# Patient Record
Sex: FEMALE | Race: WHITE | NOT HISPANIC OR LATINO | Employment: UNEMPLOYED | URBAN - METROPOLITAN AREA
[De-identification: names, ages, dates, MRNs, and addresses within clinical notes are randomized per-mention and may not be internally consistent; named-entity substitution may affect disease eponyms.]

---

## 2019-09-04 ENCOUNTER — HOSPITAL ENCOUNTER (OUTPATIENT)
Dept: SURGERY | Facility: CLINIC | Age: 44
Discharge: HOME | End: 2019-09-04
Attending: UROLOGY

## 2019-09-04 ENCOUNTER — HOSPITAL ENCOUNTER (OUTPATIENT)
Dept: GENERAL RADIOLOGY | Facility: HOSPITAL | Age: 44
Discharge: HOME | End: 2019-09-04
Attending: UROLOGY

## 2019-09-04 ENCOUNTER — OFFICE VISIT CONVERTED (OUTPATIENT)
Dept: UROLOGY | Facility: CLINIC | Age: 44
End: 2019-09-04
Attending: UROLOGY

## 2019-09-07 LAB
AMOXICILLIN+CLAV SUSC ISLT: 4
AMPICILLIN SUSC ISLT: 8
AMPICILLIN+SULBAC SUSC ISLT: 4
BACTERIA UR CULT: ABNORMAL
CEFAZOLIN SUSC ISLT: <=4
CEFEPIME SUSC ISLT: <=1
CEFTAZIDIME SUSC ISLT: <=1
CEFTRIAXONE SUSC ISLT: <=1
CEFUROXIME ORAL SUSC ISLT: 16
CEFUROXIME PARENTER SUSC ISLT: 16
CIPROFLOXACIN SUSC ISLT: <=0.25
ERTAPENEM SUSC ISLT: <=0.5
GENTAMICIN SUSC ISLT: <=1
LEVOFLOXACIN SUSC ISLT: <=0.12
NITROFURANTOIN SUSC ISLT: 32
TETRACYCLINE SUSC ISLT: <=1
TMP SMX SUSC ISLT: <=20
TOBRAMYCIN SUSC ISLT: <=1

## 2020-05-27 ENCOUNTER — TELEMEDICINE CONVERTED (OUTPATIENT)
Dept: UROLOGY | Facility: CLINIC | Age: 45
End: 2020-05-27
Attending: UROLOGY

## 2020-11-13 ENCOUNTER — TELEPHONE CONVERTED (OUTPATIENT)
Dept: UROLOGY | Facility: CLINIC | Age: 45
End: 2020-11-13
Attending: UROLOGY

## 2021-05-13 ENCOUNTER — HOSPITAL ENCOUNTER (OUTPATIENT)
Dept: SURGERY | Facility: CLINIC | Age: 46
Discharge: HOME | End: 2021-05-13
Attending: UROLOGY

## 2021-05-13 ENCOUNTER — OFFICE VISIT CONVERTED (OUTPATIENT)
Dept: UROLOGY | Facility: CLINIC | Age: 46
End: 2021-05-13
Attending: UROLOGY

## 2021-05-13 NOTE — PROGRESS NOTES
"   Progress Note      Patient Name: Rajesh Mortensen   Patient ID: [id number]   Sex: Female   YOB: 1975    Referring Provider: Elena Huynh MD    Visit Date: May 27, 2020    Provider: Elena Huynh MD   Location: [facility]   Location Address: [address] [place] [address]  [id number]   Location Phone: [phone]          Chief Complaint  · Pt is here for urological concerns      History Of Present Illness  Video Conferencing Visit  Rajesh Mortensen is a 44 year old /White female who is presenting for evaluation via video conferencing via zoom. Verbal consent obtained before beginning visit.   The following staff were present during this visit: Breanne Kat MA      History of MS, IC, and nephrolithiasis presents to establish urologic care.  Patient last saw her urologist in [place] and approximately 2017.  Currently believes that she is having an IC flare notes worsening of baseline IC symptoms, questions whether she has a UTI.  Reports baseline symptoms of burning, small amount of output, pelvic pain and urgency.  Notes relief of symptoms with \"just sitting on the toilet all the time.\"  Patient has been on numerous medications and tried various therapies over the years, primarily in [place] with former urologist.  Prior therapies include bladder instillation, Interstim PNE, Elmiron, hydroxyzine, Pyridium, Enablex, vaginal Valium among others.  History of nephrolithiasis last treated in 2013 with [facility] reports good results with this. Notes occasional moderate bilateral flank pain currently.  Denies fever or hematuria.    Update 5/27/2020: Patient presents for routine follow-up.  Patient became tearful during encounter as been unable to obtain care with neurologist since moved.  She states that she has been off of some of her MS meds for 3 months and others for up to a year.  Patient has also run out of her IC medications including hydroxyzine and Pyridium.  Patient " states she believes that she was doing better when taking Elmiron and requests restarting this.  Denies UTI since last visit, no flank pain.  Notes some bothersome incontinence which occurs without warning and is alarming to her.  She is uncertain if this is her IC, baseline, or MS.  But it has gotten worse since last visit.       Past Medical History  GERD (gastroesophageal reflux disease); Hypohidrotic ectodermal dysplasia syndrome; Interstitial cystitis (chronic) without hematuria; Kidney Stone; MS (multiple sclerosis); Nephrolithiasis         Past Surgical History  Carpal Tunnel Release; Gallbladder removal; Lithotripsy for kidney stones; Shoulder surgery         Medication List  Adderall XR 30 mg oral capsule,extended release 24hr; Azo Bladder Control 300 mg oral capsule; gabapentin 100 mg oral capsule; hydroxyzine HCl 25 mg oral tablet; Nuvigil 250 mg oral tablet; Prilosec oral; Requip 0.5 mg oral tablet; Wellbutrin  mg oral tablet extended release 24 hr         Allergy List  ampicillin       Allergies Reconciled  Family Medical History  *No Known Family History         Social History  Tobacco (Never)         Review of Systems  · Constitutional  o Denies  o : chills, fever  · Gastrointestinal  o Denies  o : nausea, vomiting, diarrhea      Physical Examination  · Constitutional  o Appearance  o : Well developed, well nourished, alert, in no acute distress.  · Head and Face  o Head  o :   § Inspection  § : Normocephalic, atraumatic  o Face  o :   § Inspection  § : No facial lesions  · Neurologic  o Mental Status Examination  o :   § Orientation  § : alert and oriented x 3  · Psychiatric  o Mood and Affect  o : mood normal, affect appropriate              Assessment  · MS (multiple sclerosis)     340/G35  · Nephrolithiasis     592.0/N20.0  · Interstitial cystitis (chronic) without hematuria     595.1/N30.10    Problems Reconciled  Plan  · Medications  o Medications have been Reconciled  o Transition of Care  or Provider Policy  · Instructions  o Electronically Identified Patient Education Materials Provided Electronically     Interstitial cystitis refill medications   hydroxyzine 25 mg tab p.o. nightly ordered  Pyridium 200 mg tab every 8 as needed dysuria ordered  Start Elmiron 100 mg nightly as patient previously taking    Nephrolithiasis KUB since last visit reviewed, continue to monitor    Refer to [facility] neurology, Dr. Huynh, to establish care for MS and obtain medications.  Discussed with patient uncertain if current worsening of urinary symptoms is due to the generalized stress of the pandemic as well as inability to obtain medical care and is likely exacerbated as has been off of her MS medications.  Recommend continued monitoring of urinary symptoms, timed and double voiding, with plan to reassess once MS has stabilized  Could consider UDS once back on MS medications if symptoms still bothersome  Patient agreeable to this  Follow-up in 3 months or earlier as needed  All questions addressed    Total time for encounter greater than 20 minutes due to counseling and coordination of care which dominated greater than 51% of the encounter.             Electronically Signed by: Elena Huynh MD -Author on May 27, 2020 01:58:01 PM

## 2021-05-13 NOTE — PROGRESS NOTES
"   Progress Note      Patient Name: Rajesh Mortensen   Patient ID: [id number]   Sex: Female   YOB: 1975        Visit Date: November 13, 2020    Provider: Elena Huynh MD   Location: [facility]   Location Address: [address] [place] [address]  [id number]   Location Phone: [phone]          Chief Complaint  · Pt is here for urological concerns     Telephone Visit- presents for evaluation via telephone.   Verbal consent obtained before beginning visit.   The following staff were present during this visit: Emma Browne LPN  Total time for encounter: 11 minutes       History Of Present Illness     44 yo lady with history of MS, IC, and nephrolithiasis presents to establish urologic care.  Patient last saw her urologist in [place] and approximately 2017.  Currently believes that she is having an IC flare notes worsening of baseline IC symptoms, questions whether she has a UTI.  Reports baseline symptoms of burning, small amount of output, pelvic pain and urgency.  Notes relief of symptoms with \"just sitting on the toilet all the time.\"  Patient has been on numerous medications and tried various therapies over the years, primarily in [place] with former urologist.  Prior therapies include bladder instillation, Interstim PNE, Elmiron, hydroxyzine, Pyridium, Enablex, vaginal Valium among others.  History of nephrolithiasis last treated in 2013 with ESWL reports good results with this. Notes occasional moderate bilateral flank pain currently.  Denies fever or hematuria.    Update 5/27/2020: Patient presents for routine follow-up.  Patient became tearful during encounter as been unable to obtain care with neurologist since moved.  She states that she has been off of some of her MS meds for 3 months and others for up to a year.  Patient has also run out of her IC medications including hydroxyzine and Pyridium.  Patient states she believes that she was doing better when taking " Elmiron and requests restarting this.  Denies UTI since last visit, no flank pain.  Notes some bothersome incontinence which occurs without warning and is alarming to her.  She is uncertain if this is her IC, baseline, or MS.  But it has gotten worse since last visit.    Update 11/13/20: Presents for follow up; doing well, finally got established with neuro at [facility], Dr. Irving; doing w/u for narcolepsy, possibly does not have MS.   Bladder symptoms are stable; has not noted much improvement on Elmiron.  Needs refill of Pyridium.  No new complaints today.       Past Medical History  GERD (gastroesophageal reflux disease); Hypohidrotic ectodermal dysplasia syndrome; Interstitial cystitis (chronic) without hematuria; Kidney stone; MS (multiple sclerosis); Nephrolithiasis         Past Surgical History  Carpal Tunnel Release; Gallbladder removal; Lithotripsy for kidney stones; Shoulder surgery         Medication List  Adderall XR 30 mg oral capsule,extended release 24hr; Azo Bladder Control 300 mg oral capsule; Elmiron 100 mg oral capsule; gabapentin 100 mg oral capsule; hydroxyzine HCl 25 mg oral tablet; Nuvigil 250 mg oral tablet; Prilosec oral; Pyridium 100 mg oral tablet; Requip 0.5 mg oral tablet; Wellbutrin  mg oral tablet extended release 24 hr         Allergy List  ampicillin       Allergies Reconciled  Family Medical History  *No Known Family History         Social History  Tobacco (Never)         Review of Systems  · Constitutional  o Denies  o : chills, fever  · Gastrointestinal  o Denies  o : nausea, vomiting, diarrhea              Assessment  · MS (multiple sclerosis)     340/G35  · Nephrolithiasis     592.0/N20.0  · Interstitial cystitis (chronic) without hematuria     595.1/N30.10    Problems Reconciled  Plan  · Orders  o Physician Telephone Evaluation, 11-20 minutes (78932) - 595.1/N30.10 - 11/13/2020  · Medications  o Medications have been Reconciled  o Transition of Care or Provider  Policy  · Instructions  o Electronically Identified Patient Education Materials Provided Electronically     Interstitial cystitis, symptoms stable refill medications   hydroxyzine 25 mg tab p.o. nightly continue   Pyridium 200 mg tab every 8 as needed dysuria; refilled     Black box warning of Elmiron discussed with patient at length; reports no beneficial response from Elmiron.  Will plan to wean off of Elmiron.  Patient to take every other night and every second night until weaning to off.  Patient agreeable to plan.  Patient encouraged to call office should she have a flare while weaning off Elmiron his bladder instillation with urology nurse practitioner could be scheduled.    As weaning off Elmiron, start amitriptyline 25 mg daily; patient previously took and tolerated well    Nephrolithiasis KUB since last visit reviewed, continue to monitor    Patient agreeable with the above plan   follow-up in 6 months or earlier as needed  All questions addressed             Electronically Signed by: Elena Huynh MD -Author on November 13, 2020 09:23:50 AM

## 2021-05-15 VITALS — WEIGHT: 221.12 LBS | RESPIRATION RATE: 14 BRPM | HEIGHT: 65 IN | BODY MASS INDEX: 36.84 KG/M2

## 2021-05-15 LAB — BACTERIA UR CULT: NORMAL

## 2021-06-06 NOTE — PROGRESS NOTES
"   Progress Note      Patient Name: Rajesh Mortensen   Patient ID: [id number]   Sex: Female   YOB: 1975        Visit Date: May 13, 2021    Provider: Elena Huynh MD   Location[facility]   Location Address: [address] [place] [address]  [id number]   Location Phone: [phone]          Chief Complaint  · Pt is here for urological concerns      History Of Present Illness     100,000 Klebsiella  4/13/2021: > 100,000 Klebsiella  3/5/2021: 50,000 Klebsiella  1/26/2021: > 100,000 Klebsiella\">46 yo lady with history of MS, IC, and nephrolithiasis presents to establish urologic care.  Patient last saw her urologist in [place] and approximately 2017.  Currently believes that she is having an IC flare notes worsening of baseline IC symptoms, questions whether she has a UTI.  Reports baseline symptoms of burning, small amount of output, pelvic pain and urgency.  Notes relief of symptoms with \"just sitting on the toilet all the time.\"  Patient has been on numerous medications and tried various therapies over the years, primarily in [place] with former urologist.  Prior therapies include bladder instillation, Interstim PNE, Elmiron, hydroxyzine, Pyridium, Enablex, vaginal Valium among others.  History of nephrolithiasis last treated in 2013 with ESWL reports good results with this. Notes occasional moderate bilateral flank pain currently.  Denies fever or hematuria.    Update 5/27/2020: Patient presents for routine follow-up.  Patient became tearful during encounter as been unable to obtain care with neurologist since moved.  She states that she has been off of some of her MS meds for 3 months and others for up to a year.  Patient has also run out of her IC medications including hydroxyzine and Pyridium.  Patient states she believes that she was doing better when taking Elmiron and requests restarting this.  Denies UTI since last visit, no flank pain.  Notes some bothersome " incontinence which occurs without warning and is alarming to her.  She is uncertain if this is her IC, baseline, or MS.  But it has gotten worse since last visit.    Update 11/13/20: Presents for follow up; doing well, finally got established with neuro at [facility], Dr. Irving; doing w/u for narcolepsy, possibly does not have MS.   Bladder symptoms are stable; has not noted much improvement on Elmiron.  Needs refill of Pyridium.  No new complaints today.    Update 5/13/2021: Patient presents for follow-up.  Has had numerous UTIs since last visit with positive urine culture. Presents with KUB prior to appointment today.  All cultures positive for Klebsiella with similar sensitivity profile.  States she does not ever have complete resolution of her symptoms while on antibiotics or after completing treatment course.  Notes her UTI symptoms to be dysuria, worsening urgency and frequency and worsening urinary urge incontinence.  Using oxytrol patch which has helped with incontinence.  Needs refill of Pyridium.  Currently on cefdinir.  PVR: 51 cc    _________  KUB, 5/10/2021 ([facility]) no acute findings.  Likely 5 mm left renal collecting system calculus    Urine culture  5/7/2021: > 100,000 Klebsiella  4/13/2021: > 100,000 Klebsiella  3/5/2021: 50,000 Klebsiella  1/26/2021: > 100,000 Klebsiella       Past Medical History  GERD (gastroesophageal reflux disease); Hypohidrotic ectodermal dysplasia syndrome; Interstitial cystitis (chronic) without hematuria; Kidney stone; MS (multiple sclerosis); Nephrolithiasis         Past Surgical History  Carpal Tunnel Release; Gallbladder removal; Lithotripsy for kidney stones; Shoulder surgery         Medication List  Adderall XR 30 mg oral capsule,extended release 24hr; amitriptyline 25 mg oral tablet; Azo Bladder Control 300 mg oral capsule; Bactrim -160 mg oral tablet; cefdinir 300 mg oral capsule; cefixime 400 mg oral capsule; Elmiron 100 mg oral capsule; gabapentin 100  "mg oral capsule; hydroxyzine HCl 25 mg oral tablet; Nuvigil 250 mg oral tablet; Prilosec oral; Pyridium 200 mg oral tablet; Requip 0.5 mg oral tablet; Sunosi 75 mg oral tablet; Wellbutrin  mg oral tablet extended release 24 hr         Allergy List  ampicillin       Allergies Reconciled  Family Medical History  *No Known Family History         Social History  Tobacco (Never)         Review of Systems  · Constitutional  o Denies  o : chills, fever  · Gastrointestinal  o Denies  o : nausea, vomiting, diarrhea      Vitals  Date Time BP Position Site L\R Cuff Size HR RR TEMP (F) WT  HT  BMI kg/m2 BSA m2 O2 Sat FR L/min FiO2 HC       05/13/2021 10:48 AM       16  123lbs 0oz 5'  5\" 20.47 1.6             Physical Examination  · Constitutional  o Appearance  o : Well nourished, well developed patient in no acute distress.  · Eyes  o Conjunctivae  o : Conjunctivae normal  o Sclerae  o : Sclerae white  · Ears, Nose, Mouth and Throat  o Ears  o :   § Hearing  § : Intact to conversational voice both ears  § Ears  § : Normal  o Nose  o :   § External Nose  § : Appearance normal  · Genitourinary  o Bladder  o : nontender to palpation  · Skin and Subcutaneous Tissue  o General Inspection  o : No rashes, lesions, or areas of discoloration present  o General Palpation  o : Skin Turgor Normal  · Neurologic  o Mental Status Examination  o :   § Orientation  § : Alert and Oriented X3  o Gait and Station  o :   § Gait Screening  § : Ambulating without difficulty  · Psychiatric  o Mood and Affect  o : Mood normal, affect appropriate          Results  · In-Office Procedures  o Surgical procedure  § IOP - Bladder Scan/Residual Urine (52001)   § Specimen vol Ur: 51mL       Assessment  · MS (multiple sclerosis)     340/G35  · Nephrolithiasis     592.0/N20.0  · Interstitial cystitis (chronic) without hematuria     595.1/N30.10  · Dysuria     788.1/R30.0  · Recurrent UTI     599.0/N39.0    Problems Reconciled  Plan  · Orders  o Urine " Culture (Clean Catch) [facility] (11401) - 788.1/R30.0 - 05/13/2021  · Medications  o Medications have been Reconciled  o Transition of Care or Provider Policy  · Instructions  o Electronically Identified Patient Education Materials Provided Electronically     Interstitial cystitis continue hydroxyzine and amitriptyline continue     Pyridium 200 mg tab every 8 as needed dysuria; refilled   Patient adequately emptying bladder with low postvoid residual bladder scan; continue to monitor given history of MS.    Nephrolithiasis small intrarenal left-sided calculi; discussed at length with patient; do not believe to be significantly impacting or exacerbating her lower urinary tract infections, continue to monitor    Recurrent versus persistent urinary tract infectionhas had 4+ urine culture UTI with Klebsiella; no intermittent relief of symptoms in between treatment.  All infections were treated per culture sensitivity.  Given lack of effectiveness of antibiotic treatment to alleviate her symptoms associated with UTI as well as persistence of Klebsiella bacteria with exact same sensitivity profile, and failure to alleviate her infection, recommend obtaining infectious diseases consultation for further evaluation and treatment considerations.  Discussed the goals of antibiotic stewardship; utilizing antibiotic for shortest duration of treatment per culture sensitivities as well as risks associated with antibiotics particularly prolonged antibiotic.  Given all these factors in our discussion, agreeable to infectious diseases consultation.  Refer to infectious disease consult    Patient currently on treatment via cefdinir; obtain urine culture today.  Will notify patient of the result.    Discussed trial of localized estrogen cream, Premarin, to assist with treatment as well as put eventual recurrence of urinary tract infection in the perimenopausal female.  Discussed that it can take up to 3 months for localized estrogen to take  effect and change the tissues.  Discussed trial of localized estrogen cream placed vaginally with patient.  Vaginal estrogen helps to create an acidic environment within the vagina.  The increased acidity makes it harder for bacteria to thrive, therefore protecting against urinary tract infections.  Discussed that along with proper hygiene, vaginal estrogen can reduce and even eliminate recurrent UTIs in some patients.    Patient does not exhibit any contraindication for trial of localized estrogen cream; discussed FDA black box warning. FDA warning notes to use lowest effective dose, shortest duration based on individual tx goals and risks.  Agreeable to trial of localized estrogen cream.  Order placed and sample provided.    Patient to follow-up in 4-6 months or earlier as needed  Obtain infectious disease consultation  Trial of localized estrogen cream, sent to pharmacy and sample provided  Pyridium refill sent to pharmacy  All questions addressed    MDM moderate: 2 stable chronic illnesses; review of KUB; prescription drug management             Electronically Signed by: Elena Huynh MD -Author on May 13, 2021 04:32:38 PM

## 2021-06-09 ENCOUNTER — TELEPHONE (OUTPATIENT)
Dept: UROLOGY | Facility: CLINIC | Age: 46
End: 2021-06-09

## 2021-06-09 NOTE — TELEPHONE ENCOUNTER
Patient said she had the UA done last Wednesday at [facility]. She said she was told to call. She asked for results.

## 2021-06-10 NOTE — TELEPHONE ENCOUNTER
LMOM, urine culture came back contaminated. She can go ahead and start prophylactic antibiotics unless she is symptomatic of a uti.

## 2021-07-15 VITALS — RESPIRATION RATE: 16 BRPM | HEIGHT: 65 IN | BODY MASS INDEX: 20.49 KG/M2 | WEIGHT: 123 LBS

## 2021-07-30 ENCOUNTER — TELEPHONE (OUTPATIENT)
Dept: UROLOGY | Facility: CLINIC | Age: 46
End: 2021-07-30

## 2021-08-06 DIAGNOSIS — N39.0 RECURRENT UTI (URINARY TRACT INFECTION): Primary | ICD-10-CM

## 2021-08-06 RX ORDER — SULFAMETHOXAZOLE AND TRIMETHOPRIM 400; 80 MG/1; MG/1
1 TABLET ORAL DAILY
Qty: 90 TABLET | Refills: 0 | Status: SHIPPED | OUTPATIENT
Start: 2021-08-06 | End: 2021-11-01 | Stop reason: SDUPTHER

## 2021-08-06 RX ORDER — AMITRIPTYLINE HYDROCHLORIDE 25 MG/1
25 TABLET, FILM COATED ORAL NIGHTLY
Qty: 30 TABLET | Refills: 3 | Status: SHIPPED | OUTPATIENT
Start: 2021-08-06 | End: 2021-12-03 | Stop reason: SDUPTHER

## 2021-11-01 ENCOUNTER — TELEPHONE (OUTPATIENT)
Dept: UROLOGY | Facility: CLINIC | Age: 46
End: 2021-11-01

## 2021-11-01 DIAGNOSIS — N39.0 RECURRENT UTI (URINARY TRACT INFECTION): ICD-10-CM

## 2021-11-01 RX ORDER — SULFAMETHOXAZOLE AND TRIMETHOPRIM 400; 80 MG/1; MG/1
1 TABLET ORAL DAILY
Qty: 90 TABLET | Refills: 1 | Status: SHIPPED | OUTPATIENT
Start: 2021-11-01 | End: 2022-01-03 | Stop reason: SDUPTHER

## 2021-11-01 NOTE — TELEPHONE ENCOUNTER
Pt wants to know if she could get some more bactrim. She has not been able to get an aapt with infectious disease yet.

## 2021-12-02 ENCOUNTER — TELEPHONE (OUTPATIENT)
Dept: UROLOGY | Facility: CLINIC | Age: 46
End: 2021-12-02

## 2021-12-03 DIAGNOSIS — N39.0 RECURRENT UTI (URINARY TRACT INFECTION): ICD-10-CM

## 2021-12-03 RX ORDER — AMITRIPTYLINE HYDROCHLORIDE 25 MG/1
25 TABLET, FILM COATED ORAL NIGHTLY
Qty: 90 TABLET | Refills: 3 | Status: SHIPPED | OUTPATIENT
Start: 2021-12-03 | End: 2022-12-01 | Stop reason: SDUPTHER

## 2021-12-03 RX ORDER — AMITRIPTYLINE HYDROCHLORIDE 25 MG/1
25 TABLET, FILM COATED ORAL NIGHTLY
Qty: 90 TABLET | Refills: 3 | Status: CANCELLED | OUTPATIENT
Start: 2021-12-03

## 2021-12-03 NOTE — ADDENDUM NOTE
Addended by: CHECO ARELLANO on: 12/3/2021 03:41 PM     Modules accepted: Level of Service, SmartSet

## 2021-12-16 ENCOUNTER — TELEPHONE (OUTPATIENT)
Dept: UROLOGY | Facility: CLINIC | Age: 46
End: 2021-12-16

## 2021-12-17 DIAGNOSIS — N30.10 INTERSTITIAL CYSTITIS: Primary | ICD-10-CM

## 2021-12-17 RX ORDER — HYDROXYZINE HYDROCHLORIDE 25 MG/1
25 TABLET, FILM COATED ORAL
Qty: 90 TABLET | Refills: 3 | Status: SHIPPED | OUTPATIENT
Start: 2021-12-17 | End: 2022-12-01 | Stop reason: SDUPTHER

## 2021-12-17 RX ORDER — HYDROXYZINE HYDROCHLORIDE 25 MG/1
25 TABLET, FILM COATED ORAL
COMMUNITY
Start: 2021-11-16 | End: 2021-12-17 | Stop reason: SDUPTHER

## 2022-01-03 ENCOUNTER — TELEPHONE (OUTPATIENT)
Dept: UROLOGY | Facility: CLINIC | Age: 47
End: 2022-01-03

## 2022-01-03 DIAGNOSIS — N39.0 RECURRENT UTI (URINARY TRACT INFECTION): ICD-10-CM

## 2022-01-03 RX ORDER — SULFAMETHOXAZOLE AND TRIMETHOPRIM 400; 80 MG/1; MG/1
1 TABLET ORAL DAILY
Qty: 90 TABLET | Refills: 1 | Status: SHIPPED | OUTPATIENT
Start: 2022-01-03 | End: 2022-04-03

## 2022-03-29 ENCOUNTER — TELEPHONE (OUTPATIENT)
Dept: UROLOGY | Facility: CLINIC | Age: 47
End: 2022-03-29

## 2022-03-29 DIAGNOSIS — R39.15 URGENCY OF URINATION: ICD-10-CM

## 2022-03-29 DIAGNOSIS — R35.0 FREQUENCY OF URINATION: Primary | ICD-10-CM

## 2022-03-29 NOTE — TELEPHONE ENCOUNTER
Patient has urinary frequency and continuous urgency.  Pain in bladder.  Thinks she has UTI, and said Dr. Doss will probably want to put in an order for her. She said she uses [facility] in [place].

## 2022-03-30 NOTE — TELEPHONE ENCOUNTER
Patient was lvmom to call back. If she calls back please let her know that we put in order for [facility] snd sent it to [facility] lab in .

## 2022-04-06 ENCOUNTER — TELEPHONE (OUTPATIENT)
Dept: SURGERY | Facility: CLINIC | Age: 47
End: 2022-04-06

## 2022-04-07 ENCOUNTER — TELEPHONE (OUTPATIENT)
Dept: UROLOGY | Facility: CLINIC | Age: 47
End: 2022-04-07

## 2022-04-07 NOTE — TELEPHONE ENCOUNTER
LMOM THAT I HAVE CALLED [facility] 3 TIMES FOR CULTURES AND ALSO FAXED A REQUEST. HAVENT RECEIVED ANYTHING FROM THEM YET. AS SOON AS WE GET SOMETHING, I WILL CALL HER. ASKED FOR CALL BACK.

## 2022-04-08 ENCOUNTER — TELEPHONE (OUTPATIENT)
Dept: UROLOGY | Facility: CLINIC | Age: 47
End: 2022-04-08

## 2022-04-08 DIAGNOSIS — N30.00 ACUTE CYSTITIS WITHOUT HEMATURIA: Primary | ICD-10-CM

## 2022-04-08 RX ORDER — LEVOFLOXACIN 500 MG/1
500 TABLET, FILM COATED ORAL DAILY
Qty: 10 TABLET | Refills: 0 | Status: SHIPPED | OUTPATIENT
Start: 2022-04-08 | End: 2022-04-18

## 2022-04-08 NOTE — TELEPHONE ENCOUNTER
Patient was called and notified of results and that atb has been sent to her pharmacy for . She confirmed understanding.

## 2022-09-20 ENCOUNTER — TELEPHONE (OUTPATIENT)
Dept: SURGERY | Facility: CLINIC | Age: 47
End: 2022-09-20

## 2022-09-20 ENCOUNTER — TELEPHONE (OUTPATIENT)
Dept: UROLOGY | Facility: CLINIC | Age: 47
End: 2022-09-20

## 2022-09-20 DIAGNOSIS — R30.0 DYSURIA: Primary | ICD-10-CM

## 2022-09-20 NOTE — TELEPHONE ENCOUNTER
Spoke with patient and she stated that she has not followed up with id because she did have an apt but was unable to keep apt because the infectious disease doctor moved to a different practice and she already waited 6 months for that apt and at the time she was not having any symptoms.  Also she stated that she is having uti symptoms burning during urination, suprapubic pain and frquency more than normal.  Stated that she has not seen blood but she is taking pyridium.  She stated she would like to do a telehealth apt to follow up with dr baca but at this time not for sure we offer that.  Will let dr baca know.

## 2022-09-20 NOTE — TELEPHONE ENCOUNTER
Patient will need and urine culture and a follow up apt with dr baca.  She was susposed to follow up 4 to 6 months after 05/2021 and obtained a infectious disease consult. Called patient to let her know and left a message on telelphone

## 2022-09-20 NOTE — TELEPHONE ENCOUNTER
----- Message from Leela Almeida RN sent at 9/20/2022  3:52 PM EDT -----  Patient is needing an follow up apt with dr baca for recurrent uti. Thanks.

## 2022-09-20 NOTE — TELEPHONE ENCOUNTER
Unfortunately, I no longer do telehealth visits.  She does need to arrange follow-up with me if she wishes to continue care otherwise, can see PCP regarding UTI.    Okay to get culture, we will notify her of the result.  Thanks

## 2022-09-20 NOTE — TELEPHONE ENCOUNTER
CALLED PT TO SCHEDULE NEXT AVAILABLE APPT W/EILEEN PER EMMA/[facility]/IF PT CALLS BACK HUB OK TO SCHEDULE APPT

## 2022-09-20 NOTE — TELEPHONE ENCOUNTER
Will collect urine for culture, bring the pt in for a follow up order for culture is in chart awaiting for lab info.

## 2022-09-21 NOTE — TELEPHONE ENCOUNTER
Spoke with pt and she stated understanding about needing a follow apt in office will do the ucx today.

## 2022-09-26 ENCOUNTER — TELEPHONE (OUTPATIENT)
Dept: UROLOGY | Facility: CLINIC | Age: 47
End: 2022-09-26

## 2022-09-26 NOTE — TELEPHONE ENCOUNTER
Patient called in wanting results of urine culture.  Advised patient it is still active and has not come back in yet.  Patient voiced understanding.

## 2022-09-28 ENCOUNTER — TELEPHONE (OUTPATIENT)
Dept: UROLOGY | Facility: CLINIC | Age: 47
End: 2022-09-28

## 2022-09-28 DIAGNOSIS — N39.0 URINARY TRACT INFECTION WITHOUT HEMATURIA, SITE UNSPECIFIED: Primary | ICD-10-CM

## 2022-09-28 RX ORDER — LEVOFLOXACIN 500 MG/1
500 TABLET, FILM COATED ORAL DAILY
Qty: 7 TABLET | Refills: 0 | Status: SHIPPED | OUTPATIENT
Start: 2022-09-28 | End: 2022-10-05

## 2022-09-28 NOTE — TELEPHONE ENCOUNTER
----- Message from Elena Doss MD sent at 9/28/2022  1:35 PM EDT -----  Reviewed urine culture which is positive for UTI.  I sent in Levaquin to the pharmacy listed on her chart.  Needs to arrange follow-up; at this point, I will defer to her PCP for treatment of her UTIs until we see her in office.  Thanks so much

## 2022-11-29 PROBLEM — N20.0 NEPHROLITHIASIS: Status: ACTIVE | Noted: 2019-09-04

## 2022-11-29 PROBLEM — K21.9 GERD (GASTROESOPHAGEAL REFLUX DISEASE): Status: ACTIVE | Noted: 2022-11-29

## 2022-11-29 PROBLEM — G35 MS (MULTIPLE SCLEROSIS): Status: ACTIVE | Noted: 2022-11-29

## 2022-11-29 PROBLEM — N30.10 INTERSTITIAL CYSTITIS (CHRONIC) WITHOUT HEMATURIA: Status: ACTIVE | Noted: 2019-09-04

## 2022-11-29 PROBLEM — Q82.4: Status: ACTIVE | Noted: 2022-11-29

## 2022-11-29 RX ORDER — BUPROPION HYDROCHLORIDE 150 MG/1
TABLET ORAL
COMMUNITY

## 2022-11-29 RX ORDER — ROPINIROLE 0.5 MG/1
TABLET, FILM COATED ORAL
COMMUNITY

## 2022-11-29 RX ORDER — SULFAMETHOXAZOLE AND TRIMETHOPRIM 400; 80 MG/1; MG/1
TABLET ORAL
COMMUNITY
Start: 2021-05-27

## 2022-11-29 RX ORDER — DEXTROAMPHETAMINE SACCHARATE, AMPHETAMINE ASPARTATE MONOHYDRATE, DEXTROAMPHETAMINE SULFATE AND AMPHETAMINE SULFATE 7.5; 7.5; 7.5; 7.5 MG/1; MG/1; MG/1; MG/1
CAPSULE, EXTENDED RELEASE ORAL
COMMUNITY

## 2022-11-29 RX ORDER — PHENAZOPYRIDINE HYDROCHLORIDE 200 MG/1
TABLET, FILM COATED ORAL
COMMUNITY
Start: 2021-05-24

## 2022-11-29 RX ORDER — ARMODAFINIL 250 MG/1
TABLET ORAL
COMMUNITY

## 2022-11-29 RX ORDER — SOLRIAMFETOL 75 MG/1
TABLET, FILM COATED ORAL
COMMUNITY

## 2022-11-29 RX ORDER — GABAPENTIN 100 MG/1
CAPSULE ORAL
COMMUNITY

## 2022-11-29 RX ORDER — OMEPRAZOLE 20 MG/1
TABLET, DELAYED RELEASE ORAL
COMMUNITY

## 2022-12-01 ENCOUNTER — OFFICE VISIT (OUTPATIENT)
Dept: UROLOGY | Facility: CLINIC | Age: 47
End: 2022-12-01

## 2022-12-01 VITALS — WEIGHT: 204.6 LBS | HEIGHT: 65 IN | RESPIRATION RATE: 12 BRPM | BODY MASS INDEX: 34.09 KG/M2

## 2022-12-01 DIAGNOSIS — R39.15 URGENCY OF URINATION: Primary | ICD-10-CM

## 2022-12-01 DIAGNOSIS — N30.10 INTERSTITIAL CYSTITIS: ICD-10-CM

## 2022-12-01 DIAGNOSIS — N39.0 RECURRENT UTI (URINARY TRACT INFECTION): ICD-10-CM

## 2022-12-01 PROCEDURE — 99214 OFFICE O/P EST MOD 30 MIN: CPT | Performed by: UROLOGY

## 2022-12-01 RX ORDER — POTASSIUM CHLORIDE 20 MEQ/1
20 TABLET, EXTENDED RELEASE ORAL DAILY
COMMUNITY
Start: 2022-11-18

## 2022-12-01 RX ORDER — HYDROXYZINE HYDROCHLORIDE 25 MG/1
25 TABLET, FILM COATED ORAL
Qty: 90 TABLET | Refills: 3 | Status: SHIPPED | OUTPATIENT
Start: 2022-12-01

## 2022-12-01 RX ORDER — ATORVASTATIN CALCIUM 10 MG/1
10 TABLET, FILM COATED ORAL DAILY
COMMUNITY
Start: 2022-11-18

## 2022-12-01 RX ORDER — AMITRIPTYLINE HYDROCHLORIDE 25 MG/1
25 TABLET, FILM COATED ORAL NIGHTLY
Qty: 90 TABLET | Refills: 3 | Status: SHIPPED | OUTPATIENT
Start: 2022-12-01

## 2022-12-01 RX ORDER — MONTELUKAST SODIUM 10 MG/1
10 TABLET ORAL
COMMUNITY
Start: 2022-11-17

## 2022-12-01 RX ORDER — GLIMEPIRIDE 4 MG/1
4 TABLET ORAL
COMMUNITY
Start: 2022-11-18

## 2022-12-01 RX ORDER — MEDROXYPROGESTERONE ACETATE 150 MG/ML
INJECTION, SUSPENSION INTRAMUSCULAR
COMMUNITY
Start: 2022-11-15

## 2022-12-01 NOTE — PROGRESS NOTES
"    UROLOGY OFFICE follow-up NOTE    Subjective   HPI  Rajesh Mortensen is a 47 y.o. female.  History of MS, IC, and nephrolithiasis presents to establish urologic care.  Patient last saw her urologist in [place] and approximately 2017.  Currently believes that she is having an IC flare notes worsening of baseline IC symptoms, questions whether she has a UTI.  Reports baseline symptoms of burning, small amount of output, pelvic pain and urgency.  Notes relief of symptoms with \"just sitting on the toilet all the time.\"  Patient has been on numerous medications and tried various therapies over the years, primarily in [place] with former urologist.  Prior therapies include bladder instillation, Interstim PNE, Elmiron, hydroxyzine, Pyridium, Enablex, vaginal Valium among others.  History of nephrolithiasis last treated in 2013 with ESWL reports good results with this. Notes occasional moderate bilateral flank pain currently.  Denies fever or hematuria.    Update 5/27/2020: Patient presents for routine follow-up.  Patient became tearful during encounter as been unable to obtain care with neurologist since moved.  She states that she has been off of some of her MS meds for 3 months and others for up to a year.  Patient has also run out of her IC medications including hydroxyzine and Pyridium.  Patient states she believes that she was doing better when taking Elmiron and requests restarting this.  Denies UTI since last visit, no flank pain.  Notes some bothersome incontinence which occurs without warning and is alarming to her.  She is uncertain if this is her IC, baseline, or MS.  But it has gotten worse since last visit.    Update 11/13/20: Presents for follow up; doing well, finally got established with neuro at [facility], Dr. Irving; doing w/u for narcolepsy, possibly does not have MS.   Bladder symptoms are stable; has not noted much improvement on Elmiron.  Needs refill of Pyridium.  No new complaints " today.    Update 5/13/2021: Patient presents for follow-up.  Has had numerous UTIs since last visit with positive urine culture. Presents with KUB prior to appointment today.  All cultures positive for Klebsiella with similar sensitivity profile.  States she does not ever have complete resolution of her symptoms while on antibiotics or after completing treatment course.  Notes her UTI symptoms to be dysuria, worsening urgency and frequency and worsening urinary urge incontinence.  Using oxytrol patch which has helped with incontinence.  Needs refill of Pyridium.  Currently on cefdinir.  PVR: 51 cc    Update 12/1/2022: Patient presents for follow-up of recurrent UTI, IC, last seen 5/2021. The patient reports that she was unable to see an infectious disease specialist. She states that she has not been treated by anyone else. The patient reports that she is taking amitriptyline 25 mg once daily, hydroxyzine, and Pyridium as needed. She denies ever using estrogen cream. The patient reports that she is managing her symptoms with diet an they have improved remarkably.   The patient notes that she was having a lot of dental issues and she is wondering if the bacteria might have been gum disease. She states that once her dental problems were under control, she has not had any issues.   She states that she has had intermittent flank pain, especially if she is dehydrated. She notes that she manages this pain with a heating pad and it resolves.   The patient reports that she is now taking potassium chloride because she is experiencing frequent cramping in her feet.  _________  KUB, 5/10/2021 ([facility]) no acute findings.  Likely 5 mm left renal collecting system calculus    Urine culture  9/22/2022: > 100,000 E. Coli  6/2/2021: 20,000 mixed  5/25/2021: > 100,000 Klebsiella  5/7/2021: > 100,000 Klebsiella  4/13/2021: > 100,000 Klebsiella  3/5/2021: 50,000 Klebsiella  1/26/2021: > 100,000  Klebsiella    Allergies:  Allergies   Allergen Reactions   • Ampicillin Unknown - Low Severity        Current Medications:  Current Outpatient Medications   Medication Sig Dispense Refill   • amitriptyline (ELAVIL) 25 MG tablet Take 1 tablet by mouth Every Night. 90 tablet 3   • Armodafinil 250 MG tablet Nuvigil 250 mg oral tablet take 1 tablet (250 mg) by oral route once daily in the morning   Active     • atorvastatin (LIPITOR) 10 MG tablet Take 10 mg by mouth Daily.     • gabapentin (NEURONTIN) 100 MG capsule gabapentin 100 mg oral capsule take 1 capsule (100 mg) by oral route 2 times per day   Active     • hydrOXYzine (ATARAX) 25 MG tablet Take 1 tablet by mouth every night at bedtime. 90 tablet 3   • medroxyPROGESTERone (DEPO-PROVERA) 150 MG/ML injection INJECT 1 MILLILITER INTRAMUSCULARLY EVERY 90 DAYS     • montelukast (SINGULAIR) 10 MG tablet Take 10 mg by mouth every night at bedtime.     • omeprazole OTC (PriLOSEC OTC) 20 MG EC tablet      • phenazopyridine (PYRIDIUM) 200 MG tablet Pyridium 200 mg oral tablet take 1 tablet (200 mg) by oral route every 8 hours AS NEEDED 5/24/2021  Active     • potassium chloride (K-DUR,KLOR-CON) 20 MEQ CR tablet Take 20 mEq by mouth Daily.     • rOPINIRole (REQUIP) 0.5 MG tablet Requip 0.5 mg oral tablet take 1 tablet (0.5 mg) by oral route 3 times per day   Active     • amphetamine-dextroamphetamine XR (ADDERALL XR) 30 MG 24 hr capsule Adderall XR 30 mg oral capsule,extended release 24hr take 1 capsule (30 mg) by oral route once daily in the morning upon awakening   Active     • buPROPion XL (WELLBUTRIN XL) 150 MG 24 hr tablet Wellbutrin  mg oral tablet extended release 24 hr take 1 tablet (150 mg) by oral route once daily   Active     • glimepiride (AMARYL) 4 MG tablet Take 4 mg by mouth Daily With Breakfast.     • Pumpkin Seed-Soy Germ (AZO BLADDER CONTROL/GO-LESS PO) Azo Bladder Control 300 mg oral capsule take 1 capsule by oral route As needed   Active     •  "Solriamfetol HCl (Sunosi) 75 MG tablet Sunosi 75 mg oral tablet take 1 tablet (75 mg) by oral route once daily upon awakening   Active     • sulfamethoxazole-trimethoprim (BACTRIM,SEPTRA) 400-80 MG tablet Bactrim 400-80 mg oral tablet take 1 tablets by oral route once daily 5/27/2021  Active       No current facility-administered medications for this visit.       Review of systems  A review of systems was performed, and positive findings are noted in the HPI.    Objective     Vital Signs:   Resp 12   Ht 165.1 cm (65\")   Wt 92.8 kg (204 lb 9.6 oz)   BMI 34.05 kg/m²       Physical exam  No acute distress, well-nourished  Awake alert and oriented  Mood normal; affect normal    Problem List:  Patient Active Problem List   Diagnosis   • GERD (gastroesophageal reflux disease)   • Hypohidrotic ectodermal dysplasia syndrome   • Interstitial cystitis (chronic) without hematuria   • MS (multiple sclerosis) (HCC)   • Nephrolithiasis       Assessment & Plan   Diagnoses and all orders for this visit:    1. Urgency of urination (Primary)    2. Interstitial cystitis  -     hydrOXYzine (ATARAX) 25 MG tablet; Take 1 tablet by mouth every night at bedtime.  Dispense: 90 tablet; Refill: 3    3. Recurrent UTI (urinary tract infection)  -     amitriptyline (ELAVIL) 25 MG tablet; Take 1 tablet by mouth Every Night.  Dispense: 90 tablet; Refill: 3      Urinary urgency-discussed conservative management strategies, timed and double voiding, limiting bladder irritants     Interstitial cystitis.  The patient is doing well. She will continue on amitriptyline 25 mg once daily, taken at night. She will continue on hydroxyzine, Pyridium as needed. She understands to maintain proper hydration and to continue dietary modifications to limit symptoms.  Prescription sent to pharmacy    Current UTI; continue on-demand treatment per culture sensitivities as symptoms arise.  Discussed ample hydration and not delaying urgency.  To call office should " she feel like she is having a UTI so that culture may be obtained treatment provided if appropriate; to take Pyridium as needed for symptom control while awaiting culture results      She will follow-up in 1 year.   All questions and concerns have been addressed at this time.            MDM moderate: 2 stable chronic illnesses; prescription drug management    Transcribed from ambient dictation for Elena Doss MD by Ean Quinonez.  12/01/22   13:32 EST    Patient or patient representative verbalized consent to the visit recording.  I have personally performed the services described in this document as transcribed by the above individual, and it is both accurate and complete.

## 2023-04-10 ENCOUNTER — TELEPHONE (OUTPATIENT)
Dept: SURGERY | Facility: CLINIC | Age: 48
End: 2023-04-10
Payer: MEDICAID

## 2023-04-10 DIAGNOSIS — N39.0 RECURRENT UTI (URINARY TRACT INFECTION): Primary | ICD-10-CM

## 2023-04-10 RX ORDER — PHENAZOPYRIDINE HYDROCHLORIDE 200 MG/1
200 TABLET, FILM COATED ORAL 3 TIMES DAILY PRN
Qty: 30 TABLET | Refills: 2 | Status: SHIPPED | OUTPATIENT
Start: 2023-04-10 | End: 2023-05-10

## 2023-04-10 NOTE — TELEPHONE ENCOUNTER
Patient called and request a refill on Pyridium. Pharmacy is correct in patient chart.   # [phone]

## 2023-12-20 ENCOUNTER — TELEPHONE (OUTPATIENT)
Dept: SURGERY | Facility: CLINIC | Age: 48
End: 2023-12-20
Payer: MEDICAID

## 2023-12-20 DIAGNOSIS — N39.0 RECURRENT UTI (URINARY TRACT INFECTION): ICD-10-CM

## 2023-12-20 RX ORDER — AMITRIPTYLINE HYDROCHLORIDE 25 MG/1
25 TABLET, FILM COATED ORAL NIGHTLY
Qty: 90 TABLET | Refills: 0 | Status: SHIPPED | OUTPATIENT
Start: 2023-12-20

## 2023-12-20 NOTE — TELEPHONE ENCOUNTER
Due for routine annual visit; please schedule.    Sent in courtesy refill until she is seen;     no further refill until office visit.    I know she travels; she could as pcp to fill in unable to f/u with us. thanks

## 2023-12-20 NOTE — TELEPHONE ENCOUNTER
----- Message from Leela Almeida RN sent at 12/20/2023  2:15 PM EST -----  Please contact and set up an apt for her annual. Thanks.

## 2024-04-18 ENCOUNTER — OFFICE VISIT (OUTPATIENT)
Dept: UROLOGY | Facility: CLINIC | Age: 49
End: 2024-04-18
Payer: MEDICAID

## 2024-04-18 VITALS
SYSTOLIC BLOOD PRESSURE: 112 MMHG | BODY MASS INDEX: 32.06 KG/M2 | RESPIRATION RATE: 16 BRPM | HEIGHT: 65 IN | DIASTOLIC BLOOD PRESSURE: 86 MMHG | WEIGHT: 192.4 LBS

## 2024-04-18 DIAGNOSIS — N30.10 INTERSTITIAL CYSTITIS: Primary | ICD-10-CM

## 2024-04-18 DIAGNOSIS — N36.42 INTRINSIC SPHINCTER DEFICIENCY: ICD-10-CM

## 2024-04-18 DIAGNOSIS — N39.3 STRESS INCONTINENCE: ICD-10-CM

## 2024-04-18 DIAGNOSIS — N39.0 RECURRENT UTI (URINARY TRACT INFECTION): ICD-10-CM

## 2024-04-18 DIAGNOSIS — R39.15 URINARY URGENCY: ICD-10-CM

## 2024-04-18 LAB — URINE VOLUME: 0

## 2024-04-18 RX ORDER — PIOGLITAZONEHYDROCHLORIDE 30 MG/1
1 TABLET ORAL DAILY
COMMUNITY
Start: 2024-01-16

## 2024-04-18 RX ORDER — AZELASTINE HYDROCHLORIDE 137 UG/1
SPRAY, METERED NASAL
COMMUNITY
Start: 2024-03-28

## 2024-04-18 RX ORDER — BLOOD SUGAR DIAGNOSTIC
STRIP MISCELLANEOUS
COMMUNITY
Start: 2024-03-06

## 2024-04-18 RX ORDER — ALBUTEROL SULFATE 90 UG/1
AEROSOL, METERED RESPIRATORY (INHALATION)
COMMUNITY
Start: 2024-03-06

## 2024-04-18 RX ORDER — PHENAZOPYRIDINE HYDROCHLORIDE 200 MG/1
200 TABLET, FILM COATED ORAL 3 TIMES DAILY PRN
COMMUNITY
Start: 2024-03-06

## 2024-04-18 RX ORDER — HYDROXYZINE HYDROCHLORIDE 25 MG/1
25 TABLET, FILM COATED ORAL
Qty: 90 TABLET | Refills: 3 | Status: SHIPPED | OUTPATIENT
Start: 2024-04-18

## 2024-04-18 RX ORDER — SEMAGLUTIDE 1.34 MG/ML
INJECTION, SOLUTION SUBCUTANEOUS
COMMUNITY
Start: 2024-04-08

## 2024-04-18 RX ORDER — UBIQUINOL 100 MG
CAPSULE ORAL SEE ADMIN INSTRUCTIONS
COMMUNITY
Start: 2024-03-06

## 2024-04-18 RX ORDER — NITROFURANTOIN 25; 75 MG/1; MG/1
100 CAPSULE ORAL 2 TIMES DAILY
Qty: 6 CAPSULE | Refills: 0 | Status: SHIPPED | OUTPATIENT
Start: 2024-04-18

## 2024-04-18 RX ORDER — GABAPENTIN 300 MG/1
CAPSULE ORAL
COMMUNITY
Start: 2024-04-15

## 2024-04-18 RX ORDER — AMITRIPTYLINE HYDROCHLORIDE 25 MG/1
25 TABLET, FILM COATED ORAL NIGHTLY
Qty: 90 TABLET | Refills: 3 | Status: SHIPPED | OUTPATIENT
Start: 2024-04-18

## 2024-04-18 NOTE — PROGRESS NOTES
"000    UROLOGY OFFICE follow-up NOTE    Subjective   HPI  Rajesh Mortensen is a 48 y.o. female.  History of MS, IC, and nephrolithiasis presents to establish urologic care.  Patient last saw her urologist in [place] and approximately 2017.  Currently believes that she is having an IC flare notes worsening of baseline IC symptoms, questions whether she has a UTI.  Reports baseline symptoms of burning, small amount of output, pelvic pain and urgency.  Notes relief of symptoms with \"just sitting on the toilet all the time.\"  Patient has been on numerous medications and tried various therapies over the years, primarily in [place] with former urologist.  Prior therapies include bladder instillation, Interstim PNE, Elmiron, hydroxyzine, Pyridium, Enablex, vaginal Valium among others.  History of nephrolithiasis last treated in 2013 with ESWL reports good results with this. Notes occasional moderate bilateral flank pain currently.  Denies fever or hematuria.    Update 5/27/2020: Patient presents for routine follow-up.  Patient became tearful during encounter as been unable to obtain care with neurologist since moved.  She states that she has been off of some of her MS meds for 3 months and others for up to a year.  Patient has also run out of her IC medications including hydroxyzine and Pyridium.  Patient states she believes that she was doing better when taking Elmiron and requests restarting this.  Denies UTI since last visit, no flank pain.  Notes some bothersome incontinence which occurs without warning and is alarming to her.  She is uncertain if this is her IC, baseline, or MS.  But it has gotten worse since last visit.    Update 11/13/20: Presents for follow up; doing well, finally got established with neuro at [facility], Dr. Irving; doing w/u for narcolepsy, possibly does not have MS.   Bladder symptoms are stable; has not noted much improvement on Elmiron.  Needs refill of Pyridium.  No new complaints " today.    Update 5/13/2021: Patient presents for follow-up.  Has had numerous UTIs since last visit with positive urine culture. Presents with KUB prior to appointment today.  All cultures positive for Klebsiella with similar sensitivity profile.  States she does not ever have complete resolution of her symptoms while on antibiotics or after completing treatment course.  Notes her UTI symptoms to be dysuria, worsening urgency and frequency and worsening urinary urge incontinence.  Using oxytrol patch which has helped with incontinence.  Needs refill of Pyridium.  Currently on cefdinir.  PVR: 51 cc     Update 12/1/2022: Patient presents for follow-up of recurrent UTI, IC, last seen 5/2021. The patient reports that she was unable to see an infectious disease specialist. She states that she has not been treated by anyone else. The patient reports that she is taking amitriptyline 25 mg once daily, hydroxyzine, and Pyridium as needed. She denies ever using estrogen cream. The patient reports that she is managing her symptoms with diet an they have improved remarkably.   The patient notes that she was having a lot of dental issues and she is wondering if the bacteria might have been gum disease. She states that once her dental problems were under control, she has not had any issues.   She states that she has had intermittent flank pain, especially if she is dehydrated. She notes that she manages this pain with a heating pad and it resolves.   The patient reports that she is now taking potassium chloride because she is experiencing frequent cramping in her feet.    Update 4/18/2024: Patient presents for routine annual visit IC, urinary urgency, recurrent UTI, history of nephrolithiasis.  On amitriptyline and hydroxyzine.    Complains of significant bladder incontinence, especially during physical activities such as walking.  She describes a sudden, minor drip-like leakage long term through her walk, which she did not realize  "until she got home. This has been a long-standing issue; much worse since last visit.     Despite attempts at pelvic exercises and weight loss, the incontinence persists. She has been consistently using pads for the past decade.     Her Jardiance dosage was increased, but she experienced oral and urinary burning, and dizziness, leading to its discontinuation over a month ago. Despite this, she continues to experience bouts of acid, the cause of which is unknown to her.     She denies any urinary tract infections since last visit.  Previously she was considering InterStim as a potential treatment for her condition; reports doing InterStim test and doing well with that. She requests a refill of her amitriptyline and hydroxyzine prescriptions.    _________  KUB, 5/10/2021 ([facility]) no acute findings.  Likely 5 mm left renal collecting system calculus    Urine culture  9/22/2022: > 100,000 E. Coli  6/2/2021: 20,000 mixed  5/25/2021: > 100,000 Klebsiella  5/7/2021: > 100,000 Klebsiella  4/13/2021: > 100,000 Klebsiella  3/5/2021: 50,000 Klebsiella  1/26/2021: > 100,000 Klebsiella      Results for orders placed or performed in visit on 04/18/24   Bladder Scan   Result Value Ref Range    Urine Volume 0          Review of systems  A review of systems was performed, and positive findings are noted in the HPI.    Objective     Vital Signs:   /86 (BP Location: Left arm)   Resp 16   Ht 165.1 cm (65\")   Wt 87.3 kg (192 lb 6.4 oz)   BMI 32.02 kg/m²       Physical exam  No acute distress, well-nourished  Awake alert and oriented  Mood normal; affect normal    Bladder Scan interpretation 04/18/2024    Estimation of residual urine via BVI 3000 [address] Bladder Scan  MA/nurse performing: Leela Almeida RN  Residual Urine: 0 ml  Indication: Interstitial cystitis    Recurrent UTI (urinary tract infection)    Stress incontinence    Intrinsic sphincter deficiency    Urinary urgency   Position: Supine  Examination: " Incremental scanning of the suprapubic area using 2.0 MHz transducer using copious amounts of acoustic gel.   Findings: An anechoic area was demonstrated which represented the bladder, with measurement of residual urine as noted. I inspected this myself. In that the residual urine was stable or insignificant, refer to plan for treatment and plan necessary at this time.           Problem List:  Patient Active Problem List   Diagnosis    GERD (gastroesophageal reflux disease)    Hypohidrotic ectodermal dysplasia syndrome    Interstitial cystitis (chronic) without hematuria    MS (multiple sclerosis)    Nephrolithiasis       Assessment & Plan   Diagnoses and all orders for this visit:    1. Interstitial cystitis (Primary)  -     Bladder Scan  -     Cystoscopy; Future  -     hydrOXYzine (ATARAX) 25 MG tablet; Take 1 tablet by mouth every night at bedtime.  Dispense: 90 tablet; Refill: 3    2. Recurrent UTI (urinary tract infection)  -     Bladder Scan  -     nitrofurantoin, macrocrystal-monohydrate, (Macrobid) 100 MG capsule; Take 1 capsule by mouth 2 (Two) Times a Day. Start day prior to urologic procedure and take until completed.  Dispense: 6 capsule; Refill: 0  -     amitriptyline (ELAVIL) 25 MG tablet; Take 1 tablet by mouth Every Night.  Dispense: 90 tablet; Refill: 3    3. Stress incontinence  -     Cystoscopy; Future  -     Cystometrogram; Future    4. Intrinsic sphincter deficiency    5. Urinary urgency  -     Cystometrogram; Future      Emptying bladder without postvoid residual    Routine medications refilled  Continue dietary strategies for symptom control IC    Discussed CASTRO, ISD at length including management options as patient symptoms refractory to conservative managementJeanette    Given her complex urologic history, recommend proceeding with functional and anatomic workup to guide management recommendations.  Patient agreeable to workup     A cystoscopy and urodynamic testing were scheduled on the same  day. A prescription for Macrobid was issued, with instructions for the patient to start the medication the day before, day of, and day after, as a precautionary measure.    Schedule UDS and cystoscopy   Will need follow-up visit after for discussion    all questions and concerns have been addressed at this time.              Transcribed from ambient dictation for Elena Doss MD by 04/18/24 Tiana Martinez.  12:33 EDT    Patient or patient representative verbalized consent to the visit recording.  I have personally performed the services described in this document as transcribed by the above individual, and it is both accurate and complete.

## 2024-04-29 ENCOUNTER — TELEPHONE (OUTPATIENT)
Dept: UROLOGY | Facility: CLINIC | Age: 49
End: 2024-04-29
Payer: MEDICAID

## 2024-04-29 NOTE — TELEPHONE ENCOUNTER
PT WAS IN THE ER AT [facility] IN [place]. SHE HAS AN OBSTRUCTING KIDNEY STONE. SHE IS HAVING A LOT OF PAIN AND NAUSEA. HAS HAD A FEVER OFF AND ON. ASKING TO BE SEEN ASAP.

## 2024-04-29 NOTE — TELEPHONE ENCOUNTER
Trying to get record from [facility] faxed request for medical records.  Did call to triage and patient stated that she has been having chills at night but have not taken her temp.  Also she stated that she is with flank pain and has nausea and but no vomiting awaiting document from the facility also educated when to and where to go to the ed. Patient stated understanding.   Also educated that when an apt is made will need a disc as well.

## 2024-04-30 ENCOUNTER — HOSPITAL ENCOUNTER (EMERGENCY)
Facility: HOSPITAL | Age: 49
Discharge: HOME | End: 2024-04-30
Attending: EMERGENCY MEDICINE
Payer: MEDICAID

## 2024-04-30 VITALS
TEMPERATURE: 99.4 F | BODY MASS INDEX: 32.02 KG/M2 | HEART RATE: 78 BPM | OXYGEN SATURATION: 94 % | SYSTOLIC BLOOD PRESSURE: 113 MMHG | DIASTOLIC BLOOD PRESSURE: 73 MMHG | HEIGHT: 65 IN | RESPIRATION RATE: 16 BRPM

## 2024-04-30 DIAGNOSIS — N20.1 URETEROLITHIASIS: Primary | ICD-10-CM

## 2024-04-30 LAB
ALBUMIN SERPL-MCNC: 3 G/DL (ref 3.5–5.2)
ALBUMIN/GLOB SERPL: 1 G/DL
ALP SERPL-CCNC: 136 U/L (ref 39–117)
ALT SERPL W P-5'-P-CCNC: 139 U/L (ref 1–33)
ANION GAP SERPL CALCULATED.3IONS-SCNC: 11.1 MMOL/L (ref 5–15)
AST SERPL-CCNC: 20 U/L (ref 1–32)
BASOPHILS # BLD AUTO: 0.02 10*3/MM3 (ref 0–0.2)
BASOPHILS NFR BLD AUTO: 0.2 % (ref 0–1.5)
BILIRUB SERPL-MCNC: 0.3 MG/DL (ref 0–1.2)
BILIRUB UR QL STRIP: NEGATIVE
BUN SERPL-MCNC: 15 MG/DL (ref 6–20)
BUN/CREAT SERPL: 12 (ref 7–25)
CALCIUM SPEC-SCNC: 8.5 MG/DL (ref 8.6–10.5)
CHLORIDE SERPL-SCNC: 93 MMOL/L (ref 98–107)
CLARITY UR: CLEAR
CO2 SERPL-SCNC: 26.9 MMOL/L (ref 22–29)
COLOR UR: YELLOW
CREAT SERPL-MCNC: 1.25 MG/DL (ref 0.57–1)
DEPRECATED RDW RBC AUTO: 35.9 FL (ref 37–54)
EGFRCR SERPLBLD CKD-EPI 2021: 52.9 ML/MIN/1.73
EOSINOPHIL # BLD AUTO: 0.14 10*3/MM3 (ref 0–0.4)
EOSINOPHIL NFR BLD AUTO: 1.6 % (ref 0.3–6.2)
ERYTHROCYTE [DISTWIDTH] IN BLOOD BY AUTOMATED COUNT: 11.8 % (ref 12.3–15.4)
GLOBULIN UR ELPH-MCNC: 2.9 GM/DL
GLUCOSE SERPL-MCNC: 291 MG/DL (ref 65–99)
GLUCOSE UR STRIP-MCNC: ABNORMAL MG/DL
HCG INTACT+B SERPL-ACNC: <0.5 MIU/ML
HCT VFR BLD AUTO: 35.8 % (ref 34–46.6)
HGB BLD-MCNC: 12.2 G/DL (ref 12–15.9)
HGB UR QL STRIP.AUTO: NEGATIVE
HOLD SPECIMEN: NORMAL
HOLD SPECIMEN: NORMAL
IMM GRANULOCYTES # BLD AUTO: 0.02 10*3/MM3 (ref 0–0.05)
IMM GRANULOCYTES NFR BLD AUTO: 0.2 % (ref 0–0.5)
KETONES UR QL STRIP: NEGATIVE
LEUKOCYTE ESTERASE UR QL STRIP.AUTO: NEGATIVE
LIPASE SERPL-CCNC: 62 U/L (ref 13–60)
LYMPHOCYTES # BLD AUTO: 1.57 10*3/MM3 (ref 0.7–3.1)
LYMPHOCYTES NFR BLD AUTO: 18.4 % (ref 19.6–45.3)
MCH RBC QN AUTO: 28.8 PG (ref 26.6–33)
MCHC RBC AUTO-ENTMCNC: 34.1 G/DL (ref 31.5–35.7)
MCV RBC AUTO: 84.4 FL (ref 79–97)
MONOCYTES # BLD AUTO: 0.58 10*3/MM3 (ref 0.1–0.9)
MONOCYTES NFR BLD AUTO: 6.8 % (ref 5–12)
NEUTROPHILS NFR BLD AUTO: 6.2 10*3/MM3 (ref 1.7–7)
NEUTROPHILS NFR BLD AUTO: 72.8 % (ref 42.7–76)
NITRITE UR QL STRIP: NEGATIVE
NRBC BLD AUTO-RTO: 0 /100 WBC (ref 0–0.2)
PH UR STRIP.AUTO: 6.5 [PH] (ref 5–8)
PLATELET # BLD AUTO: 178 10*3/MM3 (ref 140–450)
PMV BLD AUTO: 8.4 FL (ref 6–12)
POTASSIUM SERPL-SCNC: 3.6 MMOL/L (ref 3.5–5.2)
PROT SERPL-MCNC: 5.9 G/DL (ref 6–8.5)
PROT UR QL STRIP: ABNORMAL
RBC # BLD AUTO: 4.24 10*6/MM3 (ref 3.77–5.28)
SODIUM SERPL-SCNC: 131 MMOL/L (ref 136–145)
SP GR UR STRIP: >=1.03 (ref 1–1.03)
UROBILINOGEN UR QL STRIP: ABNORMAL
WBC NRBC COR # BLD AUTO: 8.53 10*3/MM3 (ref 3.4–10.8)
WHOLE BLOOD HOLD COAG: NORMAL
WHOLE BLOOD HOLD SPECIMEN: NORMAL

## 2024-04-30 PROCEDURE — 25010000002 ONDANSETRON PER 1 MG: Performed by: EMERGENCY MEDICINE

## 2024-04-30 PROCEDURE — 85025 COMPLETE CBC W/AUTO DIFF WBC: CPT | Performed by: EMERGENCY MEDICINE

## 2024-04-30 PROCEDURE — 99283 EMERGENCY DEPT VISIT LOW MDM: CPT

## 2024-04-30 PROCEDURE — 80053 COMPREHEN METABOLIC PANEL: CPT | Performed by: EMERGENCY MEDICINE

## 2024-04-30 PROCEDURE — 25010000002 KETOROLAC TROMETHAMINE PER 15 MG: Performed by: EMERGENCY MEDICINE

## 2024-04-30 PROCEDURE — 96375 TX/PRO/DX INJ NEW DRUG ADDON: CPT

## 2024-04-30 PROCEDURE — 84702 CHORIONIC GONADOTROPIN TEST: CPT | Performed by: EMERGENCY MEDICINE

## 2024-04-30 PROCEDURE — 83690 ASSAY OF LIPASE: CPT | Performed by: EMERGENCY MEDICINE

## 2024-04-30 PROCEDURE — 96376 TX/PRO/DX INJ SAME DRUG ADON: CPT

## 2024-04-30 PROCEDURE — 25010000002 HYDROMORPHONE 1 MG/ML SOLUTION: Performed by: EMERGENCY MEDICINE

## 2024-04-30 PROCEDURE — 96374 THER/PROPH/DIAG INJ IV PUSH: CPT

## 2024-04-30 PROCEDURE — 81003 URINALYSIS AUTO W/O SCOPE: CPT

## 2024-04-30 RX ORDER — KETOROLAC TROMETHAMINE 10 MG/1
10 TABLET, FILM COATED ORAL EVERY 6 HOURS PRN
Status: ON HOLD | COMMUNITY
End: 2024-05-06

## 2024-04-30 RX ORDER — ONDANSETRON 4 MG/1
4 TABLET, ORALLY DISINTEGRATING ORAL EVERY 6 HOURS PRN
Qty: 12 TABLET | Refills: 0 | Status: SHIPPED | OUTPATIENT
Start: 2024-04-30

## 2024-04-30 RX ORDER — OXYCODONE HYDROCHLORIDE AND ACETAMINOPHEN 5; 325 MG/1; MG/1
1 TABLET ORAL EVERY 6 HOURS PRN
Status: ON HOLD | COMMUNITY
End: 2024-05-06

## 2024-04-30 RX ORDER — OXYCODONE HYDROCHLORIDE AND ACETAMINOPHEN 5; 325 MG/1; MG/1
1 TABLET ORAL EVERY 6 HOURS PRN
Qty: 12 TABLET | Refills: 0 | Status: SHIPPED | OUTPATIENT
Start: 2024-04-30 | End: 2024-05-06 | Stop reason: HOSPADM

## 2024-04-30 RX ORDER — KETOROLAC TROMETHAMINE 30 MG/ML
30 INJECTION, SOLUTION INTRAMUSCULAR; INTRAVENOUS ONCE
Status: COMPLETED | OUTPATIENT
Start: 2024-04-30 | End: 2024-04-30

## 2024-04-30 RX ORDER — SODIUM CHLORIDE 0.9 % (FLUSH) 0.9 %
10 SYRINGE (ML) INJECTION AS NEEDED
Status: DISCONTINUED | OUTPATIENT
Start: 2024-04-30 | End: 2024-04-30 | Stop reason: HOSPADM

## 2024-04-30 RX ORDER — CEFDINIR 300 MG/1
300 CAPSULE ORAL 2 TIMES DAILY
COMMUNITY

## 2024-04-30 RX ORDER — KETOROLAC TROMETHAMINE 10 MG/1
10 TABLET, FILM COATED ORAL EVERY 6 HOURS PRN
Qty: 15 TABLET | Refills: 0 | Status: SHIPPED | OUTPATIENT
Start: 2024-04-30 | End: 2024-05-06 | Stop reason: HOSPADM

## 2024-04-30 RX ORDER — ONDANSETRON 2 MG/ML
4 INJECTION INTRAMUSCULAR; INTRAVENOUS ONCE
Status: COMPLETED | OUTPATIENT
Start: 2024-04-30 | End: 2024-04-30

## 2024-04-30 RX ADMIN — KETOROLAC TROMETHAMINE 30 MG: 30 INJECTION, SOLUTION INTRAMUSCULAR; INTRAVENOUS at 12:09

## 2024-04-30 RX ADMIN — HYDROMORPHONE HYDROCHLORIDE 1 MG: 1 INJECTION, SOLUTION INTRAMUSCULAR; INTRAVENOUS; SUBCUTANEOUS at 15:02

## 2024-04-30 RX ADMIN — Medication 10 ML: at 12:12

## 2024-04-30 RX ADMIN — Medication 10 ML: at 12:10

## 2024-04-30 RX ADMIN — ONDANSETRON 4 MG: 2 INJECTION INTRAMUSCULAR; INTRAVENOUS at 12:09

## 2024-04-30 RX ADMIN — HYDROMORPHONE HYDROCHLORIDE 1 MG: 1 INJECTION, SOLUTION INTRAMUSCULAR; INTRAVENOUS; SUBCUTANEOUS at 12:15

## 2024-04-30 RX ADMIN — Medication 10 ML: at 12:16

## 2024-04-30 NOTE — TELEPHONE ENCOUNTER
Called [facility] and they stated that they could not take a verbal for faxing records without a release of information.  Tried to call patient and patient did not answer phone

## 2024-04-30 NOTE — ED PROVIDER NOTES
Time: 11:54 AM EDT  Date of encounter:  4/30/2024  Independent Historian/Clinical History and Information was obtained by:   Patient    History is limited by: N/A    Chief Complaint: Left flank pain due to kidney stone      History of Present Illness:  Patient is a 48 y.o. year old female who presents to the emergency department for evaluation of left flank pain due to kidney stone.  Patient was seen at [facility] in [place] 4 days ago and she reports she had a CAT scan done showing a 5 x 10 cm left ureteral stone.  She is a patient of Dr. Doss's, urology, and was advised to follow-up with her on Monday.  Patient has been unable to speak with Dr. Doss and came to the emergency department today.    HPI    Patient Care Team  Primary Care Provider: Arley Cason MD    Past Medical History:     Allergies   Allergen Reactions    Ampicillin Unknown - Low Severity     Past Medical History:   Diagnosis Date    Diabetes mellitus      No past surgical history on file.  No family history on file.    Home Medications:  Prior to Admission medications    Medication Sig Start Date End Date Taking? Authorizing Provider   Alcohol Swabs (Alcohol Prep) 70 % pads See Admin Instructions. 3/6/24   Hemant Lin MD   amitriptyline (ELAVIL) 25 MG tablet Take 1 tablet by mouth Every Night. 4/18/24   Elena Doss MD   Armodafinil 250 MG tablet Nuvigil 250 mg oral tablet take 1 tablet (250 mg) by oral route once daily in the morning   Active    Hemant Lin MD   atorvastatin (LIPITOR) 10 MG tablet Take 10 mg by mouth Daily. 11/18/22   Hemant Lin MD   Azelastine HCl 137 MCG/SPRAY solution place 1-2 SPRAYS into EACH nostril TWICE DAILY 3/28/24   Hemant Lin MD   cefdinir (OMNICEF) 300 MG capsule Take 1 capsule by mouth 2 (Two) Times a Day.    Hemant Lin MD   gabapentin (NEURONTIN) 300 MG capsule TAKE 3 CAPSULES BY MOUTH ONCE DAILY (2-3 HOURS PRIOR TO SLEEP) 4/15/24    Hemant Lin MD   glimepiride (AMARYL) 4 MG tablet Take 4 mg by mouth Daily With Breakfast. 11/18/22   Hemant Lin MD   hydrOXYzine (ATARAX) 25 MG tablet Take 1 tablet by mouth every night at bedtime. 4/18/24   Elena Doss MD   ketorolac (TORADOL) 10 MG tablet Take 1 tablet by mouth Every 6 (Six) Hours As Needed for Moderate Pain.    Hemant Lin MD   medroxyPROGESTERone (DEPO-PROVERA) 150 MG/ML injection INJECT 1 MILLILITER INTRAMUSCULARLY EVERY 90 DAYS 11/15/22   Hemant Lin MD   nitrofurantoin, macrocrystal-monohydrate, (Macrobid) 100 MG capsule Take 1 capsule by mouth 2 (Two) Times a Day. Start day prior to urologic procedure and take until completed. 4/18/24   Elena Doss MD   omeprazole OTC (PriLOSEC OTC) 20 MG EC tablet     Hemant Lin MD   OneTouch Ultra test strip USE AS DIRECTED. for testing TWICE DAILY 3/6/24   Hemant Lin MD   oxyCODONE-acetaminophen (PERCOCET) 5-325 MG per tablet Take 1 tablet by mouth Every 6 (Six) Hours As Needed.    Hemant Lin MD   Ozempic, 1 MG/DOSE, 4 MG/3ML solution pen-injector INJECT 1 MG SUBCUTANEOUSLY ONCE WEEKLY 4/8/24   Hemant Lin MD   phenazopyridine (PYRIDIUM) 200 MG tablet Take 1 tablet by mouth 3 (Three) Times a Day As Needed for Bladder Spasms. 3/6/24   Hemant Lin MD   pioglitazone (ACTOS) 30 MG tablet Take 1 tablet by mouth Daily. 1/16/24   Hemant Lin MD   Ventolin  (90 Base) MCG/ACT inhaler inhale 1 puff EVERY 4 HOURS AS NEEDED 3/6/24   Hemant Lin MD        Social History:   Social History     Tobacco Use    Smoking status: Never     Passive exposure: Past    Smokeless tobacco: Never   Vaping Use    Vaping status: Never Used         Review of Systems:  Review of Systems   Gastrointestinal:  Positive for nausea.   Genitourinary:  Positive for flank pain.        Physical Exam:  /73   Pulse 78   Temp 99.4 °F (37.4 °C) (Oral)    "Resp 16   Ht 165.1 cm (65\")   LMP  (Exact Date)   SpO2 94%   BMI 32.02 kg/m²     Physical Exam  Vitals and nursing note reviewed.   Constitutional:       General: She is not in acute distress.  Cardiovascular:      Rate and Rhythm: Normal rate and regular rhythm.      Heart sounds: Normal heart sounds.   Pulmonary:      Effort: Pulmonary effort is normal. No respiratory distress.      Breath sounds: Normal breath sounds.   Abdominal:      General: Abdomen is flat.      Palpations: Abdomen is soft.      Tenderness: There is no abdominal tenderness.   Musculoskeletal:         General: Normal range of motion.   Skin:     General: Skin is warm and dry.   Neurological:      Mental Status: She is alert and oriented to person, place, and time.                  Procedures:  Procedures      Medical Decision Making:      Comorbidities that affect care:    History of kidney stones    External Notes reviewed:    None available      The following orders were placed and all results were independently analyzed by me:  Orders Placed This Encounter   Procedures    Wilber Draw    Comprehensive Metabolic Panel    Lipase    Urinalysis With Microscopic If Indicated (No Culture) - Urine, Clean Catch    hCG, Quantitative, Pregnancy    CBC Auto Differential    NPO Diet NPO Type: Strict NPO    Undress & Gown    General MD Inpatient Consult    Insert Peripheral IV    CBC & Differential    Green Top (Gel)    Lavender Top    Gold Top - SST    Light Blue Top       Medications Given in the Emergency Department:  Medications   sodium chloride 0.9 % flush 10 mL (10 mL Intravenous Given 4/30/24 1216)   ketorolac (TORADOL) injection 30 mg (30 mg Intravenous Given 4/30/24 1209)   HYDROmorphone (DILAUDID) injection 1 mg (1 mg Intravenous Given 4/30/24 1215)   ondansetron (ZOFRAN) injection 4 mg (4 mg Intravenous Given 4/30/24 1209)   HYDROmorphone (DILAUDID) injection 1 mg (1 mg Intravenous Given 4/30/24 1502)        ED Course:     "     Labs:    Lab Results (last 24 hours)       Procedure Component Value Units Date/Time    Urinalysis With Microscopic If Indicated (No Culture) - Urine, Clean Catch [[id number]]  (Abnormal) Collected: 04/30/24 1007    Specimen: Urine, Clean Catch Updated: 04/30/24 1022     Color, UA Yellow     Appearance, UA Clear     pH, UA 6.5     Specific Gravity, UA >=1.030     Glucose, UA >=1000 mg/dL (3+)     Ketones, UA Negative     Bilirubin, UA Negative     Blood, UA Negative     Protein, UA Trace     Leuk Esterase, UA Negative     Nitrite, UA Negative     Urobilinogen, UA 0.2 E.U./dL    Narrative:      Urine microscopic not indicated.    CBC & Differential [[id number]]  (Abnormal) Collected: 04/30/24 1121    Specimen: Blood from Arm, Right Updated: 04/30/24 1140    Narrative:      The following orders were created for panel order CBC & Differential.  Procedure                               Abnormality         Status                     ---------                               -----------         ------                     CBC Auto Differential[[id number]]        Abnormal            Final result                 Please view results for these tests on the individual orders.    Comprehensive Metabolic Panel [[id number]]  (Abnormal) Collected: 04/30/24 1121    Specimen: Blood from Arm, Right Updated: 04/30/24 1158     Glucose 291 mg/dL      BUN 15 mg/dL      Creatinine 1.25 mg/dL      Sodium 131 mmol/L      Potassium 3.6 mmol/L      Chloride 93 mmol/L      CO2 26.9 mmol/L      Calcium 8.5 mg/dL      Total Protein 5.9 g/dL      Albumin 3.0 g/dL      ALT (SGPT) 139 U/L      AST (SGOT) 20 U/L      Alkaline Phosphatase 136 U/L      Total Bilirubin 0.3 mg/dL      Globulin 2.9 gm/dL      A/G Ratio 1.0 g/dL      BUN/Creatinine Ratio 12.0     Anion Gap 11.1 mmol/L      eGFR 52.9 mL/min/1.73     Narrative:      GFR Normal >60  Chronic Kidney Disease <60  Kidney Failure <15      Lipase [[id number]]  (Abnormal) Collected: 04/30/24 1121     Specimen: Blood from Arm, Right Updated: 04/30/24 1158     Lipase 62 U/L     hCG, Quantitative, Pregnancy [[id number]] Collected: 04/30/24 1121    Specimen: Blood from Arm, Right Updated: 04/30/24 1156     HCG Quantitative <0.50 mIU/mL     Narrative:      HCG Ranges by Gestational Age    Females - non-pregnant premenopausal   </= 1mIU/mL HCG  Females - postmenopausal               </= 7mIU/mL HCG    3 Weeks       5.4   -      72 mIU/mL  4 Weeks      10.2   -     708 mIU/mL  5 Weeks       217   -   8,245 mIU/mL  6 Weeks       152   -  32,177 mIU/mL  7 Weeks     4,059   - 153,767 mIU/mL  8 Weeks    31,366   - 149,094 mIU/mL  9 Weeks    59,109   - 135,901 mIU/mL  10 Weeks   44,186   - 170,409 mIU/mL  12 Weeks   27,107   - 201,615 mIU/mL  14 Weeks   24,302   -  93,646 mIU/mL  15 Weeks   12,540   -  69,747 mIU/mL  16 Weeks    8,904   -  55,332 mIU/mL  17 Weeks    8,240   -  51,793 mIU/mL  18 Weeks    9,649   -  55,271 mIU/mL      CBC Auto Differential [[id number]]  (Abnormal) Collected: 04/30/24 1121    Specimen: Blood from Arm, Right Updated: 04/30/24 1140     WBC 8.53 10*3/mm3      RBC 4.24 10*6/mm3      Hemoglobin 12.2 g/dL      Hematocrit 35.8 %      MCV 84.4 fL      MCH 28.8 pg      MCHC 34.1 g/dL      RDW 11.8 %      RDW-SD 35.9 fl      MPV 8.4 fL      Platelets 178 10*3/mm3      Neutrophil % 72.8 %      Lymphocyte % 18.4 %      Monocyte % 6.8 %      Eosinophil % 1.6 %      Basophil % 0.2 %      Immature Grans % 0.2 %      Neutrophils, Absolute 6.20 10*3/mm3      Lymphocytes, Absolute 1.57 10*3/mm3      Monocytes, Absolute 0.58 10*3/mm3      Eosinophils, Absolute 0.14 10*3/mm3      Basophils, Absolute 0.02 10*3/mm3      Immature Grans, Absolute 0.02 10*3/mm3      nRBC 0.0 /100 WBC              Imaging:    No Radiology Exams Resulted Within Past 24 Hours      Differential Diagnosis and Discussion:    Flank Pain: Differential diagnosis includes but is not limited to kidney stones, pyelonephritis, musculoskeletal  disorders, renal infarction, urinary tract infection, hydronephrosis, radiculopathy, aortic aneurysm, renal cell carcinoma.    All labs were reviewed and interpreted by me.    MDM     The patient presents with flank pain. Patient was found to have ureterolithiasis on CT. The patient´s labs and urinalysis were reviewed. Patient is now resting comfortably, feels better, is alert, and is in no distress. The repeat examination is unremarkable and benign. The patient has no signs of urosepsis. The patient is referred to the on-call urologist for follow up and is discharged with oral medication for pain control and Flomax. The patient was counseled to return to the ER for fever >100.5, intractable pain or vomiting, or any other concerns that the may have. The patient has expressed a clear and thorough understanding and agreed to follow up as instructed.      Patient Care Considerations:    ANTIBIOTICS: I considered prescribing antibiotics as an outpatient however no bacterial focus of infection was found.      Consultants/Shared Management Plan:    Patient discussed with Dr. Michael, urology, who recommends that the patient can be discharged on pain medications and nausea medications and to call the office to arrange follow-up on Thursday or Friday of this week to have the stone removed with either herself or Dr. Doss.    Social Determinants of Health:    Patient has presented with family members who are responsible, reliable and will ensure follow up care.      Disposition and Care Coordination:    Discharged: The patient is suitable and stable for discharge with no need for consideration of admission.    I have explained the patient´s condition, diagnoses and treatment plan based on the information available to me at this time. I have answered questions and addressed any concerns. The patient has a good  understanding of the patient´s diagnosis, condition, and treatment plan as can be expected at this point. The vital  signs have been stable. The patient´s condition is stable and appropriate for discharge from the emergency department.      The patient will pursue further outpatient evaluation with the primary care physician or other designated or consulting physician as outlined in the discharge instructions. They are agreeable to this plan of care and follow-up instructions have been explained in detail. The patient has received these instructions in written format and has expressed an understanding of the discharge instructions. The patient is aware that any significant change in condition or worsening of symptoms should prompt an immediate return to this or the closest emergency department or call to 911.  I have explained discharge medications and the need for follow up with the patient/caretakers. This was also printed in the discharge instructions. Patient was discharged with the following medications and follow up:      Medication List        New Prescriptions      ondansetron ODT 4 MG disintegrating tablet  Commonly known as: ZOFRAN-ODT  Place 1 tablet on the tongue Every 6 (Six) Hours As Needed for Nausea or Vomiting.            Changed      * ketorolac 10 MG tablet  Commonly known as: TORADOL  Take 1 tablet by mouth Every 6 (Six) Hours As Needed for Moderate Pain.  What changed: You were already taking a medication with the same name, and this prescription was added. Make sure you understand how and when to take each.     * ketorolac 10 MG tablet  Commonly known as: TORADOL  What changed: Another medication with the same name was added. Make sure you understand how and when to take each.     * oxyCODONE-acetaminophen 5-325 MG per tablet  Commonly known as: PERCOCET  Take 1 tablet by mouth Every 6 (Six) Hours As Needed for Severe Pain.  What changed: You were already taking a medication with the same name, and this prescription was added. Make sure you understand how and when to take each.     * oxyCODONE-acetaminophen  5-325 MG per tablet  Commonly known as: PERCOCET  What changed: Another medication with the same name was added. Make sure you understand how and when to take each.           * This list has 4 medication(s) that are the same as other medications prescribed for you. Read the directions carefully, and ask your doctor or other care provider to review them with you.                   Where to Get Your Medications        These medications were sent to [facility]  [facility] [address] - [phone]  - [phone]   [address] [address]      Phone: [phone]   ketorolac 10 MG tablet  ondansetron ODT 4 MG disintegrating tablet  oxyCODONE-acetaminophen 5-325 MG per tablet      Elena Doss MD  [address]  [place] [address] [phone]    Call         Final diagnoses:   Ureterolithiasis        ED Disposition       ED Disposition   Discharge    Condition   Stable    Comment   --               This medical record created using voice recognition software.             Eduardo Mejía DO  04/30/24 3049

## 2024-04-30 NOTE — DISCHARGE INSTRUCTIONS
Contact Dr. Doss's office today to arrange for f kidney stone removal on Thursday either with Dr. Michael or Friday with Dr. Doss.    Obtain a copy of the CD disc of the CAT scan done at [facility] and bring with you on your appointment on Thursday or Friday

## 2024-05-01 ENCOUNTER — TELEPHONE (OUTPATIENT)
Dept: UROLOGY | Facility: CLINIC | Age: 49
End: 2024-05-01
Payer: MEDICAID

## 2024-05-01 NOTE — TELEPHONE ENCOUNTER
Faxed records request to [facility] at [facility] at fax number [phone] per aNsrin request - NO ANSWER       Spoke to Brandon with Radiology to power [employer] CT report

## 2024-05-02 ENCOUNTER — PREP FOR SURGERY (OUTPATIENT)
Dept: OTHER | Facility: HOSPITAL | Age: 49
End: 2024-05-02
Payer: MEDICAID

## 2024-05-02 ENCOUNTER — TELEPHONE (OUTPATIENT)
Dept: UROLOGY | Facility: CLINIC | Age: 49
End: 2024-05-02
Payer: MEDICAID

## 2024-05-02 DIAGNOSIS — N20.0 NEPHROLITHIASIS: ICD-10-CM

## 2024-05-02 DIAGNOSIS — N20.1 URETEROLITHIASIS: ICD-10-CM

## 2024-05-02 DIAGNOSIS — N20.1 LEFT URETERAL STONE: Primary | ICD-10-CM

## 2024-05-02 RX ORDER — KETOROLAC TROMETHAMINE 10 MG/1
10 TABLET, FILM COATED ORAL EVERY 6 HOURS PRN
Qty: 8 TABLET | Refills: 0 | Status: SHIPPED | OUTPATIENT
Start: 2024-05-02 | End: 2024-05-06 | Stop reason: HOSPADM

## 2024-05-02 RX ORDER — BUPIVACAINE HCL/0.9 % NACL/PF 0.1 %
2000 PLASTIC BAG, INJECTION (ML) EPIDURAL ONCE
Status: CANCELLED | OUTPATIENT
Start: 2024-05-02 | End: 2024-05-02

## 2024-05-02 RX ORDER — OXYCODONE AND ACETAMINOPHEN 7.5; 325 MG/1; MG/1
.5-1 TABLET ORAL EVERY 4 HOURS PRN
Qty: 10 TABLET | Refills: 0 | Status: SHIPPED | OUTPATIENT
Start: 2024-05-02 | End: 2024-05-06 | Stop reason: HOSPADM

## 2024-05-02 NOTE — TELEPHONE ENCOUNTER
Spoke with  daisha. Have received records dr baca have look and evaluated them she looked at her schedule and and stated that she could do surgery on Monday may 6,2024 patient  stated that dr baca could do it on Friday and this nurse stated that dr baca personally looked at the imaging report and also her schedule and gave a date on 5/6/24 patient ailyn stated understanding.

## 2024-05-02 NOTE — TELEPHONE ENCOUNTER
PATIENT'S , DARCY, CALLED AND ASKED FOR PAIN MEDICATION REFILL FOR PATIENT.  HE SAID SHE HAS BEEN ALTERNATING TORADOL AND PERCOCET, WHICH SHE WAS PRESCRIBED AT ER.    PHARMACY VERIFIED.    HE GAVE PATIENT'S NUMBER [phone] TO CALL BACK.

## 2024-05-02 NOTE — TELEPHONE ENCOUNTER
Spoke with patient to let her know that we still have not receive medical records.  Patient stated that she would like an apt and this nurse stated that we need to have records and imaging so dr baca can know what is needing done and where the stone is at and the position.

## 2024-05-02 NOTE — TELEPHONE ENCOUNTER
Medications refilled.      If patient develops uncontrolled pain, persistent emesis, or fever over 101, will need to go to ER with urologist. There are urologists in the [place] area.       __________  Notify patient that the hospital in [place] is still having issues getting as the actual imaging.      I would like patient to go to the outside hospital and obtain disc of her imaging in anticipation of her procedure.      She is to bring with her so that I may review prior to the OR for surgical planning.    Thank you

## 2024-05-03 ENCOUNTER — TELEPHONE (OUTPATIENT)
Dept: UROLOGY | Facility: CLINIC | Age: 49
End: 2024-05-03
Payer: MEDICAID

## 2024-05-03 PROBLEM — N20.1 LEFT URETERAL STONE: Status: ACTIVE | Noted: 2024-05-02

## 2024-05-03 NOTE — TELEPHONE ENCOUNTER
Called spoke with  to have disc for dr baca because he cannot get the images powershared.  He stated understanding

## 2024-05-03 NOTE — TELEPHONE ENCOUNTER
Left detailed voicemail informing PT we need to get her scheduled for surgery on May 6th with MATT

## 2024-05-05 ENCOUNTER — ANESTHESIA EVENT (OUTPATIENT)
Dept: PERIOP | Facility: HOSPITAL | Age: 49
End: 2024-05-05
Payer: MEDICAID

## 2024-05-06 ENCOUNTER — APPOINTMENT (OUTPATIENT)
Dept: GENERAL RADIOLOGY | Facility: HOSPITAL | Age: 49
End: 2024-05-06
Payer: MEDICAID

## 2024-05-06 ENCOUNTER — ANESTHESIA (OUTPATIENT)
Dept: PERIOP | Facility: HOSPITAL | Age: 49
End: 2024-05-06
Payer: MEDICAID

## 2024-05-06 ENCOUNTER — HOSPITAL ENCOUNTER (OUTPATIENT)
Facility: HOSPITAL | Age: 49
Setting detail: HOSPITAL OUTPATIENT SURGERY
Discharge: HOME | End: 2024-05-06
Attending: UROLOGY | Admitting: UROLOGY
Payer: MEDICAID

## 2024-05-06 ENCOUNTER — HOSPITAL ENCOUNTER (OUTPATIENT)
Dept: OTHER | Facility: HOSPITAL | Age: 49
Setting detail: HOSPITAL OUTPATIENT SURGERY
Discharge: HOME | End: 2024-05-06
Payer: MEDICAID

## 2024-05-06 VITALS
BODY MASS INDEX: 31.29 KG/M2 | RESPIRATION RATE: 14 BRPM | TEMPERATURE: 97 F | WEIGHT: 187.83 LBS | HEART RATE: 79 BPM | DIASTOLIC BLOOD PRESSURE: 72 MMHG | OXYGEN SATURATION: 98 % | HEIGHT: 65 IN | SYSTOLIC BLOOD PRESSURE: 125 MMHG

## 2024-05-06 DIAGNOSIS — N20.1 LEFT URETERAL STONE: ICD-10-CM

## 2024-05-06 DIAGNOSIS — N20.0 NEPHROLITHIASIS: ICD-10-CM

## 2024-05-06 LAB
GLUCOSE BLDC GLUCOMTR-MCNC: 178 MG/DL (ref 70–99)
GLUCOSE BLDC GLUCOMTR-MCNC: 192 MG/DL (ref 70–99)

## 2024-05-06 PROCEDURE — 25510000001 IOPAMIDOL PER 1 ML: Performed by: UROLOGY

## 2024-05-06 PROCEDURE — 88300 SURGICAL PATH GROSS: CPT | Performed by: UROLOGY

## 2024-05-06 PROCEDURE — 76000 FLUOROSCOPY <1 HR PHYS/QHP: CPT

## 2024-05-06 PROCEDURE — 82365 CALCULUS SPECTROSCOPY: CPT | Performed by: UROLOGY

## 2024-05-06 PROCEDURE — 25010000002 CEFAZOLIN PER 500 MG: Performed by: UROLOGY

## 2024-05-06 PROCEDURE — 52356 CYSTO/URETERO W/LITHOTRIPSY: CPT | Performed by: UROLOGY

## 2024-05-06 PROCEDURE — C2617 STENT, NON-COR, TEM W/O DEL: HCPCS | Performed by: UROLOGY

## 2024-05-06 PROCEDURE — 82948 REAGENT STRIP/BLOOD GLUCOSE: CPT | Performed by: NURSE ANESTHETIST, CERTIFIED REGISTERED

## 2024-05-06 PROCEDURE — 25010000002 SUGAMMADEX 200 MG/2ML SOLUTION: Performed by: NURSE ANESTHETIST, CERTIFIED REGISTERED

## 2024-05-06 PROCEDURE — 25010000002 ONDANSETRON PER 1 MG: Performed by: NURSE ANESTHETIST, CERTIFIED REGISTERED

## 2024-05-06 PROCEDURE — 25010000002 MIDAZOLAM PER 1MG: Performed by: ANESTHESIOLOGY

## 2024-05-06 PROCEDURE — C1758 CATHETER, URETERAL: HCPCS | Performed by: UROLOGY

## 2024-05-06 PROCEDURE — 52352 CYSTOURETERO W/STONE REMOVE: CPT | Performed by: UROLOGY

## 2024-05-06 PROCEDURE — C1769 GUIDE WIRE: HCPCS | Performed by: UROLOGY

## 2024-05-06 PROCEDURE — 25010000002 HYDROMORPHONE 1 MG/ML SOLUTION: Performed by: NURSE ANESTHETIST, CERTIFIED REGISTERED

## 2024-05-06 PROCEDURE — C1894 INTRO/SHEATH, NON-LASER: HCPCS | Performed by: UROLOGY

## 2024-05-06 PROCEDURE — 25010000002 PROPOFOL 10 MG/ML EMULSION: Performed by: NURSE ANESTHETIST, CERTIFIED REGISTERED

## 2024-05-06 PROCEDURE — S0260 H&P FOR SURGERY: HCPCS | Performed by: UROLOGY

## 2024-05-06 PROCEDURE — 25810000003 LACTATED RINGERS PER 1000 ML: Performed by: ANESTHESIOLOGY

## 2024-05-06 PROCEDURE — 25010000002 FENTANYL CITRATE (PF) 50 MCG/ML SOLUTION: Performed by: NURSE ANESTHETIST, CERTIFIED REGISTERED

## 2024-05-06 PROCEDURE — 25010000002 DEXAMETHASONE PER 1 MG: Performed by: NURSE ANESTHETIST, CERTIFIED REGISTERED

## 2024-05-06 PROCEDURE — 82948 REAGENT STRIP/BLOOD GLUCOSE: CPT

## 2024-05-06 DEVICE — URETERAL STENT
Type: IMPLANTABLE DEVICE | Site: URETER | Status: FUNCTIONAL
Brand: ASCERTA™

## 2024-05-06 RX ORDER — ONDANSETRON 4 MG/1
4 TABLET, ORALLY DISINTEGRATING ORAL ONCE AS NEEDED
Status: DISCONTINUED | OUTPATIENT
Start: 2024-05-06 | End: 2024-05-06 | Stop reason: HOSPADM

## 2024-05-06 RX ORDER — PROMETHAZINE HYDROCHLORIDE 25 MG/1
25 SUPPOSITORY RECTAL ONCE AS NEEDED
Status: DISCONTINUED | OUTPATIENT
Start: 2024-05-06 | End: 2024-05-06 | Stop reason: HOSPADM

## 2024-05-06 RX ORDER — IBUPROFEN 600 MG/1
600 TABLET ORAL EVERY 6 HOURS PRN
Status: DISCONTINUED | OUTPATIENT
Start: 2024-05-06 | End: 2024-05-06 | Stop reason: HOSPADM

## 2024-05-06 RX ORDER — MIDAZOLAM HYDROCHLORIDE 2 MG/2ML
2 INJECTION, SOLUTION INTRAMUSCULAR; INTRAVENOUS ONCE
Status: COMPLETED | OUTPATIENT
Start: 2024-05-06 | End: 2024-05-06

## 2024-05-06 RX ORDER — SODIUM CHLORIDE, SODIUM LACTATE, POTASSIUM CHLORIDE, CALCIUM CHLORIDE 600; 310; 30; 20 MG/100ML; MG/100ML; MG/100ML; MG/100ML
9 INJECTION, SOLUTION INTRAVENOUS CONTINUOUS PRN
Status: DISCONTINUED | OUTPATIENT
Start: 2024-05-06 | End: 2024-05-06 | Stop reason: HOSPADM

## 2024-05-06 RX ORDER — PROPOFOL 10 MG/ML
VIAL (ML) INTRAVENOUS AS NEEDED
Status: DISCONTINUED | OUTPATIENT
Start: 2024-05-06 | End: 2024-05-06 | Stop reason: SURG

## 2024-05-06 RX ORDER — ONDANSETRON 2 MG/ML
4 INJECTION INTRAMUSCULAR; INTRAVENOUS ONCE AS NEEDED
Status: DISCONTINUED | OUTPATIENT
Start: 2024-05-06 | End: 2024-05-06 | Stop reason: HOSPADM

## 2024-05-06 RX ORDER — OXYCODONE HYDROCHLORIDE AND ACETAMINOPHEN 5; 325 MG/1; MG/1
1-2 TABLET ORAL EVERY 6 HOURS PRN
Qty: 16 TABLET | Refills: 0 | Status: SHIPPED | OUTPATIENT
Start: 2024-05-06 | End: 2024-05-10 | Stop reason: SDUPTHER

## 2024-05-06 RX ORDER — ACETAMINOPHEN 500 MG
1000 TABLET ORAL ONCE
Status: COMPLETED | OUTPATIENT
Start: 2024-05-06 | End: 2024-05-06

## 2024-05-06 RX ORDER — OXYCODONE HYDROCHLORIDE 5 MG/1
5 TABLET ORAL
Status: DISCONTINUED | OUTPATIENT
Start: 2024-05-06 | End: 2024-05-06 | Stop reason: HOSPADM

## 2024-05-06 RX ORDER — BUPIVACAINE HCL/0.9 % NACL/PF 0.1 %
2000 PLASTIC BAG, INJECTION (ML) EPIDURAL ONCE
Status: COMPLETED | OUTPATIENT
Start: 2024-05-06 | End: 2024-05-06

## 2024-05-06 RX ORDER — OXYBUTYNIN CHLORIDE 5 MG/1
5 TABLET ORAL 3 TIMES DAILY PRN
Qty: 18 TABLET | Refills: 0 | Status: SHIPPED | OUTPATIENT
Start: 2024-05-06

## 2024-05-06 RX ORDER — LIDOCAINE HYDROCHLORIDE 20 MG/ML
INJECTION, SOLUTION EPIDURAL; INFILTRATION; INTRACAUDAL; PERINEURAL AS NEEDED
Status: DISCONTINUED | OUTPATIENT
Start: 2024-05-06 | End: 2024-05-06 | Stop reason: SURG

## 2024-05-06 RX ORDER — ROCURONIUM BROMIDE 10 MG/ML
INJECTION, SOLUTION INTRAVENOUS AS NEEDED
Status: DISCONTINUED | OUTPATIENT
Start: 2024-05-06 | End: 2024-05-06 | Stop reason: SURG

## 2024-05-06 RX ORDER — SCOLOPAMINE TRANSDERMAL SYSTEM 1 MG/1
1 PATCH, EXTENDED RELEASE TRANSDERMAL ONCE
Status: DISCONTINUED | OUTPATIENT
Start: 2024-05-06 | End: 2024-05-06 | Stop reason: HOSPADM

## 2024-05-06 RX ORDER — PHENAZOPYRIDINE HYDROCHLORIDE 200 MG/1
200 TABLET, FILM COATED ORAL 3 TIMES DAILY PRN
Qty: 20 TABLET | Refills: 0 | Status: SHIPPED | OUTPATIENT
Start: 2024-05-06

## 2024-05-06 RX ORDER — ONDANSETRON 2 MG/ML
INJECTION INTRAMUSCULAR; INTRAVENOUS AS NEEDED
Status: DISCONTINUED | OUTPATIENT
Start: 2024-05-06 | End: 2024-05-06 | Stop reason: SURG

## 2024-05-06 RX ORDER — KETOROLAC TROMETHAMINE 10 MG/1
10 TABLET, FILM COATED ORAL EVERY 6 HOURS PRN
Qty: 12 TABLET | Refills: 0 | Status: SHIPPED | OUTPATIENT
Start: 2024-05-06 | End: 2024-05-13 | Stop reason: SDUPTHER

## 2024-05-06 RX ORDER — ACETAMINOPHEN 325 MG/1
650 TABLET ORAL ONCE
Status: DISCONTINUED | OUTPATIENT
Start: 2024-05-06 | End: 2024-05-06

## 2024-05-06 RX ORDER — PROMETHAZINE HYDROCHLORIDE 12.5 MG/1
25 TABLET ORAL ONCE AS NEEDED
Status: DISCONTINUED | OUTPATIENT
Start: 2024-05-06 | End: 2024-05-06 | Stop reason: HOSPADM

## 2024-05-06 RX ORDER — NALOXONE HYDROCHLORIDE 4 MG/.1ML
SPRAY NASAL
Qty: 2 EACH | Refills: 0 | Status: SHIPPED | OUTPATIENT
Start: 2024-05-06

## 2024-05-06 RX ORDER — LIDOCAINE HYDROCHLORIDE 20 MG/ML
JELLY TOPICAL AS NEEDED
Status: DISCONTINUED | OUTPATIENT
Start: 2024-05-06 | End: 2024-05-06 | Stop reason: HOSPADM

## 2024-05-06 RX ORDER — TAMSULOSIN HYDROCHLORIDE 0.4 MG/1
1 CAPSULE ORAL DAILY
Qty: 12 CAPSULE | Refills: 0 | Status: SHIPPED | OUTPATIENT
Start: 2024-05-06

## 2024-05-06 RX ORDER — DEXAMETHASONE SODIUM PHOSPHATE 4 MG/ML
INJECTION, SOLUTION INTRA-ARTICULAR; INTRALESIONAL; INTRAMUSCULAR; INTRAVENOUS; SOFT TISSUE AS NEEDED
Status: DISCONTINUED | OUTPATIENT
Start: 2024-05-06 | End: 2024-05-06 | Stop reason: SURG

## 2024-05-06 RX ORDER — MAGNESIUM HYDROXIDE 1200 MG/15ML
LIQUID ORAL AS NEEDED
Status: DISCONTINUED | OUTPATIENT
Start: 2024-05-06 | End: 2024-05-06 | Stop reason: HOSPADM

## 2024-05-06 RX ORDER — PROMETHAZINE HYDROCHLORIDE 12.5 MG/1
12.5 TABLET ORAL ONCE AS NEEDED
Status: DISCONTINUED | OUTPATIENT
Start: 2024-05-06 | End: 2024-05-06 | Stop reason: HOSPADM

## 2024-05-06 RX ORDER — FENTANYL CITRATE 50 UG/ML
INJECTION, SOLUTION INTRAMUSCULAR; INTRAVENOUS AS NEEDED
Status: DISCONTINUED | OUTPATIENT
Start: 2024-05-06 | End: 2024-05-06 | Stop reason: SURG

## 2024-05-06 RX ADMIN — ROCURONIUM BROMIDE 50 MG: 10 INJECTION, SOLUTION INTRAVENOUS at 07:58

## 2024-05-06 RX ADMIN — HYDROMORPHONE HYDROCHLORIDE 0.5 MG: 1 INJECTION, SOLUTION INTRAMUSCULAR; INTRAVENOUS; SUBCUTANEOUS at 09:14

## 2024-05-06 RX ADMIN — PROPOFOL 150 MG: 10 INJECTION, EMULSION INTRAVENOUS at 07:58

## 2024-05-06 RX ADMIN — SODIUM CHLORIDE, POTASSIUM CHLORIDE, SODIUM LACTATE AND CALCIUM CHLORIDE 9 ML/HR: 600; 310; 30; 20 INJECTION, SOLUTION INTRAVENOUS at 07:47

## 2024-05-06 RX ADMIN — SUGAMMADEX 100 MG: 100 INJECTION, SOLUTION INTRAVENOUS at 08:53

## 2024-05-06 RX ADMIN — Medication 2000 MG: at 07:54

## 2024-05-06 RX ADMIN — OXYCODONE 5 MG: 5 TABLET ORAL at 09:26

## 2024-05-06 RX ADMIN — ACETAMINOPHEN 1000 MG: 500 TABLET ORAL at 07:47

## 2024-05-06 RX ADMIN — SCOPALAMINE 1 PATCH: 1 PATCH, EXTENDED RELEASE TRANSDERMAL at 07:48

## 2024-05-06 RX ADMIN — FENTANYL CITRATE 50 MCG: 50 INJECTION, SOLUTION INTRAMUSCULAR; INTRAVENOUS at 08:12

## 2024-05-06 RX ADMIN — LIDOCAINE HYDROCHLORIDE 60 MG: 20 INJECTION, SOLUTION INTRAVENOUS at 07:58

## 2024-05-06 RX ADMIN — HYDROMORPHONE HYDROCHLORIDE 0.5 MG: 1 INJECTION, SOLUTION INTRAMUSCULAR; INTRAVENOUS; SUBCUTANEOUS at 09:26

## 2024-05-06 RX ADMIN — ONDANSETRON HYDROCHLORIDE 4 MG: 2 SOLUTION INTRAMUSCULAR; INTRAVENOUS at 08:08

## 2024-05-06 RX ADMIN — FENTANYL CITRATE 50 MCG: 50 INJECTION, SOLUTION INTRAMUSCULAR; INTRAVENOUS at 07:58

## 2024-05-06 RX ADMIN — DEXAMETHASONE SODIUM PHOSPHATE 4 MG: 4 INJECTION, SOLUTION INTRAMUSCULAR; INTRAVENOUS at 08:08

## 2024-05-06 RX ADMIN — MIDAZOLAM HYDROCHLORIDE 2 MG: 1 INJECTION, SOLUTION INTRAMUSCULAR; INTRAVENOUS at 07:48

## 2024-05-08 ENCOUNTER — DOCUMENTATION (OUTPATIENT)
Dept: UROLOGY | Facility: CLINIC | Age: 49
End: 2024-05-08
Payer: MEDICAID

## 2024-05-08 LAB
LAB AP CASE REPORT: NORMAL
LAB AP CLINICAL INFORMATION: NORMAL
PATH REPORT.FINAL DX SPEC: NORMAL
PATH REPORT.GROSS SPEC: NORMAL

## 2024-05-10 ENCOUNTER — PATIENT MESSAGE (OUTPATIENT)
Dept: UROLOGY | Facility: CLINIC | Age: 49
End: 2024-05-10
Payer: MEDICAID

## 2024-05-10 DIAGNOSIS — N20.0 NEPHROLITHIASIS: ICD-10-CM

## 2024-05-10 DIAGNOSIS — N20.1 LEFT URETERAL STONE: ICD-10-CM

## 2024-05-10 RX ORDER — AMITRIPTYLINE HYDROCHLORIDE 50 MG/1
50 TABLET, FILM COATED ORAL
COMMUNITY
Start: 2024-04-25

## 2024-05-10 RX ORDER — BUSPIRONE HYDROCHLORIDE 10 MG/1
10 TABLET ORAL 3 TIMES DAILY PRN
COMMUNITY
Start: 2024-04-25

## 2024-05-10 RX ORDER — PSEUDOEPHEDRINE HYDROCHLORIDE 60 MG/1
TABLET, FILM COATED ORAL
COMMUNITY

## 2024-05-10 RX ORDER — ROPINIROLE 2 MG/1
TABLET, FILM COATED ORAL EVERY 24 HOURS
COMMUNITY

## 2024-05-10 RX ORDER — SOLRIAMFETOL 75 MG/1
TABLET, FILM COATED ORAL EVERY 24 HOURS
COMMUNITY

## 2024-05-13 DIAGNOSIS — N20.1 LEFT URETERAL STONE: ICD-10-CM

## 2024-05-13 DIAGNOSIS — N20.0 NEPHROLITHIASIS: ICD-10-CM

## 2024-05-13 RX ORDER — OXYCODONE HYDROCHLORIDE AND ACETAMINOPHEN 5; 325 MG/1; MG/1
1-2 TABLET ORAL EVERY 6 HOURS PRN
Qty: 8 TABLET | Refills: 0 | Status: SHIPPED | OUTPATIENT
Start: 2024-05-13 | End: 2024-05-13 | Stop reason: SDUPTHER

## 2024-05-13 RX ORDER — KETOROLAC TROMETHAMINE 10 MG/1
10 TABLET, FILM COATED ORAL EVERY 6 HOURS PRN
Qty: 8 TABLET | Refills: 0 | Status: SHIPPED | OUTPATIENT
Start: 2024-05-13

## 2024-05-13 RX ORDER — OXYCODONE HYDROCHLORIDE AND ACETAMINOPHEN 5; 325 MG/1; MG/1
1-2 TABLET ORAL EVERY 6 HOURS PRN
Qty: 8 TABLET | Refills: 0 | Status: SHIPPED | OUTPATIENT
Start: 2024-05-13

## 2024-05-13 NOTE — TELEPHONE ENCOUNTER
From: Rajesh Mortensen  To: Elena Doss  Sent: 5/10/2024 4:12 PM EDT  Subject: Percocet refill     It seems like I’m not going to get this refilled prior to my appt but if it is possible, my pharmacy is [facility]. It’s on file. We picked up meds in hospital on Monday. That is what is showing on the refill request

## 2024-05-14 ENCOUNTER — PROCEDURE VISIT (OUTPATIENT)
Dept: UROLOGY | Facility: CLINIC | Age: 49
End: 2024-05-14
Payer: MEDICAID

## 2024-05-14 ENCOUNTER — TELEPHONE (OUTPATIENT)
Dept: UROLOGY | Facility: CLINIC | Age: 49
End: 2024-05-14

## 2024-05-14 VITALS — HEIGHT: 65 IN | BODY MASS INDEX: 31.52 KG/M2 | RESPIRATION RATE: 16 BRPM | WEIGHT: 189.2 LBS

## 2024-05-14 DIAGNOSIS — T19.9XXA FOREIGN BODY IN GENITOURINARY TRACT, INITIAL ENCOUNTER: Primary | ICD-10-CM

## 2024-05-14 DIAGNOSIS — N13.30 HYDRONEPHROSIS, UNSPECIFIED HYDRONEPHROSIS TYPE: ICD-10-CM

## 2024-05-15 NOTE — PROGRESS NOTES
Preoperative diagnosis  Foreign body in genitourinary tract; hydronephrosis    Postoperative diagnosis  Same as above    Procedure  Flexible cystourethroscopy with stent removal    Attending surgeon  Elena Doss MD     Anesthesia  2% lidocaine jelly intraurethrally    Complications  None    Specimen  None    Estimated blood loss  0cc    Indications  48 y.o. female who is status post left ureteroscopy with stone treatment who presents for stent removal.    Procedure  The patient was appropriately identified and brought to the cytoscopy suite. A timeout was undertaken documenting the correct patient, site, and procedure. The patient was prepped in a normal sterile fashion. A flexible cytoscope was then introduced into the bladder. The stent was identified and grasped with endoscopic graspers. The entire stent was removed and passed off the field. Patient tolerated the procedure well. There were no intraprocedural complications.     Plan  Tolerated procedure well.  Instructions provided.  Patient follow-up in 4 to 6 weeks with renal ultrasound prior or earlier as needed  All question addressed

## 2024-05-16 LAB
CALCIUM OXALATE DIHYDRATE MFR STONE IR: 70 %
COLOR STONE: NORMAL
COM MFR STONE: 30 %
COMPN STONE: NORMAL
LABORATORY COMMENT REPORT: NORMAL
Lab: NORMAL
Lab: NORMAL
PHOTO: NORMAL
SIZE STONE: NORMAL MM
SPEC SOURCE SUBJ: NORMAL
WT STONE: 224 MG

## 2024-06-03 ENCOUNTER — PATIENT MESSAGE (OUTPATIENT)
Dept: UROLOGY | Facility: CLINIC | Age: 49
End: 2024-06-03
Payer: MEDICAID

## 2024-06-04 NOTE — TELEPHONE ENCOUNTER
From: Rajesh Mortensen  To: Elena Doss  Sent: 6/3/2024 4:25 PM EDT  Subject: Cancel appt     I need to cancel my appt for the 5th. I will call to reschedule

## 2024-06-20 ENCOUNTER — TELEPHONE (OUTPATIENT)
Dept: UROLOGY | Facility: CLINIC | Age: 49
End: 2024-06-20
Payer: MEDICAID

## 2024-07-01 NOTE — TELEPHONE ENCOUNTER
3RD CALL - CALLED PT TO OFFER R/S OF CX'D APPT 6/25 W/ EILEEN    NO ANSWER, LMOM    THREE ATTEMPT MADE TO OFFER R/S, ANYTHING ELSE TO DO?

## 2025-05-12 DIAGNOSIS — N30.10 INTERSTITIAL CYSTITIS: ICD-10-CM

## 2025-05-12 DIAGNOSIS — N39.0 RECURRENT UTI (URINARY TRACT INFECTION): ICD-10-CM

## 2025-05-13 RX ORDER — HYDROXYZINE HYDROCHLORIDE 25 MG/1
25 TABLET, FILM COATED ORAL
Qty: 90 TABLET | Refills: 3 | OUTPATIENT
Start: 2025-05-13

## 2025-06-24 RX ORDER — DROSPIRENONE 4 MG/1
1 TABLET, FILM COATED ORAL DAILY
COMMUNITY
Start: 2025-05-07

## 2025-06-24 RX ORDER — MONTELUKAST SODIUM 10 MG/1
10 TABLET ORAL
COMMUNITY
Start: 2025-04-21

## 2025-07-01 ENCOUNTER — OFFICE VISIT (OUTPATIENT)
Dept: UROLOGY | Age: 50
End: 2025-07-01
Payer: COMMERCIAL

## 2025-07-01 VITALS — HEIGHT: 65 IN | BODY MASS INDEX: 31.49 KG/M2 | WEIGHT: 189 LBS

## 2025-07-01 DIAGNOSIS — N30.10 INTERSTITIAL CYSTITIS: Primary | ICD-10-CM

## 2025-07-01 DIAGNOSIS — N13.30 HYDRONEPHROSIS, UNSPECIFIED HYDRONEPHROSIS TYPE: ICD-10-CM

## 2025-07-01 DIAGNOSIS — N39.0 RECURRENT UTI (URINARY TRACT INFECTION): ICD-10-CM

## 2025-07-01 RX ORDER — HYDROXYZINE HYDROCHLORIDE 25 MG/1
25 TABLET, FILM COATED ORAL
Qty: 90 TABLET | Refills: 3 | Status: SHIPPED | OUTPATIENT
Start: 2025-07-01

## 2025-07-01 NOTE — PROGRESS NOTES
"    UROLOGY OFFICE FOLLOW UP NOTE    Subjective   HPI  Rajesh Mortensen is a 49 y.o. female. History of MS, IC, and nephrolithiasis presents to establish urologic care.  Patient last saw her urologist in [place] and approximately 2017.  Currently believes that she is having an IC flare notes worsening of baseline IC symptoms, questions whether she has a UTI.  Reports baseline symptoms of burning, small amount of output, pelvic pain and urgency.  Notes relief of symptoms with \"just sitting on the toilet all the time.\"  Patient has been on numerous medications and tried various therapies over the years, primarily in [place] with former urologist.  Prior therapies include bladder instillation, Interstim PNE, Elmiron, hydroxyzine, Pyridium, Enablex, vaginal Valium among others.  History of nephrolithiasis last treated in 2013 with [facility] reports good results with this. Notes occasional moderate bilateral flank pain currently.  Denies fever or hematuria.    Update 5/27/2020: Patient presents for routine follow-up.  Patient became tearful during encounter as been unable to obtain care with neurologist since moved.  She states that she has been off of some of her MS meds for 3 months and others for up to a year.  Patient has also run out of her IC medications including hydroxyzine and Pyridium.  Patient states she believes that she was doing better when taking Elmiron and requests restarting this.  Denies UTI since last visit, no flank pain.  Notes some bothersome incontinence which occurs without warning and is alarming to her.  She is uncertain if this is her IC, baseline, or MS.  But it has gotten worse since last visit.    Update 11/13/20: Presents for follow up; doing well, finally got established with neuro at [facility], Dr. Irving; doing w/u for narcolepsy, possibly does not have MS.   Bladder symptoms are stable; has not noted much improvement on Elmiron.  Needs refill of Pyridium.  No new complaints " today.    Update 5/13/2021: Patient presents for follow-up.  Has had numerous UTIs since last visit with positive urine culture. Presents with [facility] prior to appointment today.  All cultures positive for Klebsiella with similar sensitivity profile.  States she does not ever have complete resolution of her symptoms while on antibiotics or after completing treatment course.  Notes her UTI symptoms to be dysuria, worsening urgency and frequency and worsening urinary urge incontinence.  Using oxytrol patch which has helped with incontinence.  Needs refill of Pyridium.  Currently on cefdinir.  PVR: 51 cc     Update 12/1/2022: Patient presents for follow-up of recurrent UTI, IC, last seen 5/2021. The patient reports that she was unable to see an infectious disease specialist. She states that she has not been treated by anyone else. The patient reports that she is taking amitriptyline 25 mg once daily, hydroxyzine, and Pyridium as needed. She denies ever using estrogen cream. The patient reports that she is managing her symptoms with diet an they have improved remarkably.   The patient notes that she was having a lot of dental issues and she is wondering if the bacteria might have been gum disease. She states that once her dental problems were under control, she has not had any issues.   She states that she has had intermittent flank pain, especially if she is dehydrated. She notes that she manages this pain with a heating pad and it resolves.   The patient reports that she is now taking potassium chloride because she is experiencing frequent cramping in her feet.     Update 4/18/2024: Patient presents for routine annual visit IC, urinary urgency, recurrent UTI, history of nephrolithiasis.  On amitriptyline and hydroxyzine.     Complains of significant bladder incontinence, especially during physical activities such as walking.  She describes a sudden, minor drip-like leakage retirement through her walk, which she did not  realize until she got home. This has been a long-standing issue; much worse since last visit.      Despite attempts at pelvic exercises and weight loss, the incontinence persists. She has been consistently using pads for the past decade.      Her Jardiance dosage was increased, but she experienced oral and urinary burning, and dizziness, leading to its discontinuation over a month ago. Despite this, she continues to experience bouts of acid, the cause of which is unknown to her.      She denies any urinary tract infections since last visit.  Previously she was considering InterStim as a potential treatment for her condition; reports doing InterStim test and doing well with that. She requests a refill of her amitriptyline and hydroxyzine prescriptions.    Update 7/1/2025: Patient presents for visit IC, urinary urgency, recurrent UTI, history of nephrolithiasis.  On amitriptyline and hydroxyzine.  Last seen 5/15/2024 for stent removal; never had ultrasound.  History of Present Illness  She reports experiencing frequent of urinary leakage, which has decreased in frequency but still occurs occasionally, particularly during coughing episodes. She has intentionally lost approximately 20 pounds, which may be contributing to the improvement in her condition.   She continues to take hydroxyzine 25 mg and amitriptyline 25 mg, which she finds beneficial.  Her interstitial cystitis is well-managed with few complications. She has not experienced significant issues related to this condition recently.    Regarding urinary tract infections, she notes that since having all her teeth removed due to infection in 06/2024, she has had better control over UTIs. She believes that eliminating the oral infection has positively impacted her overall health, including reducing the frequency of UTIs.    She was put on Ozempic to control her diabetes, which has also contributed to her weight loss. Her multiple sclerosis symptoms have improved as  "well.    Needs medication refill        _________  Left URS stone treatment 5/6/2024    [facility], 5/10/2021 ([facility]) no acute findings.  Likely 5 mm left renal collecting system calculus    Urine culture  9/22/2022: > 100,000 E. Coli  6/2/2021: 20,000 mixed  5/25/2021: > 100,000 Klebsiella  5/7/2021: > 100,000 Klebsiella  4/13/2021: > 100,000 Klebsiella  3/5/2021: 50,000 Klebsiella  1/26/2021: > 100,000 Klebsiella          Review of systems  A review of systems was performed, and positive findings are noted in the HPI.    Objective     Vital Signs:   Ht 165.1 cm (65\")   Wt 85.7 kg (189 lb)   BMI 31.45 kg/m²       Physical exam  No acute distress, well-nourished  Awake alert and oriented  Mood normal; affect normal  Physical Exam        Problem List:  Patient Active Problem List   Diagnosis    GERD (gastroesophageal reflux disease)    Hypohidrotic ectodermal dysplasia syndrome    Interstitial cystitis (chronic) without hematuria    MS (multiple sclerosis)    Nephrolithiasis    Left ureteral stone       Assessment & Plan   Diagnoses and all orders for this visit:    1. Interstitial cystitis (Primary)  -     hydrOXYzine (ATARAX) 25 MG tablet; Take 1 tablet by mouth every night at bedtime.  Dispense: 90 tablet; Refill: 3    2. Recurrent UTI (urinary tract infection)  -     amitriptyline (ELAVIL) 25 MG tablet; Take 1 tablet by mouth Every Night.  Dispense: 90 tablet; Refill: 3    3. Hydronephrosis, unspecified hydronephrosis type  -     US Renal Bilateral; Future      Overall doing well from urology standpoint  Assessment & Plan  1. Interstitial cystitis.  Her interstitial cystitis is currently under control. She is advised to continue her current medication regimen. A prescription for hydroxyzine 25 mg and amitriptyline 25 mg has been provided. If she experiences any issues, she should contact the office. The potential side effects of these medications     was discussed  2. Urinary tract infection.  She has not " had significant issues with UTIs since her dental infection was resolved. She is advised to monitor for any signs of UTI and to request a culture if symptoms arise.     S/p stone treatment -an ultrasound of the kidney system has been ordered to ensure proper drainage and healing post-stone procedure. The results will be communicated upon receipt. The importance of the ultrasound is to detect any silent obstruction or blockage that could lead to kidney damage in the future.    Await renal ultrasound; will notify her once results obtained  Follow-up  The patient will follow up in 1 year.       All questions addressed      Patient or patient representative verbalized consent for the use of Ambient Listening during the visit with  Elena Doss MD for chart documentation. 7/1/2025  13:54 EDT

## (undated) DEVICE — URETERAL ACCESS SHEATH SET: Brand: NAVIGATOR HD

## (undated) DEVICE — NITINOL STONE RETRIEVAL BASKET: Brand: ESCAPE

## (undated) DEVICE — SKIN PREP TRAY W/CHG: Brand: MEDLINE INDUSTRIES, INC.

## (undated) DEVICE — Y-TYPE TUR/BLADDER IRRIGATION SET, REGULATING CLAMP

## (undated) DEVICE — ENDOSCOPIC VALVE WITH ADAPTER.: Brand: SURSEAL® II

## (undated) DEVICE — SYS IRR PUMP SGL ACTN VAC SYR 10CC

## (undated) DEVICE — GLOVE,SURG,SENSICARE SLT,LF,PF,7: Brand: MEDLINE

## (undated) DEVICE — TOWEL,OR,DSP,ST,BLUE,STD,4/PK,20PK/CS: Brand: MEDLINE

## (undated) DEVICE — Device

## (undated) DEVICE — BASIC SINGLE BASIN-LF: Brand: MEDLINE INDUSTRIES, INC.

## (undated) DEVICE — FIBR LASR HOLMIUM COMPAT 272MH DISP

## (undated) DEVICE — CYSTO PACK: Brand: MEDLINE INDUSTRIES, INC.

## (undated) DEVICE — CATH 2L URETRL HC 6F 50CM

## (undated) DEVICE — SOL IRR NACL 0.9PCT 3000ML

## (undated) DEVICE — NITINOL WIRE WITH HYDROPHILIC TIP: Brand: SENSOR

## (undated) DEVICE — CATH URETRL FLXITP POLLACK STD 5F 70CM